# Patient Record
Sex: FEMALE | Employment: UNEMPLOYED | URBAN - METROPOLITAN AREA
[De-identification: names, ages, dates, MRNs, and addresses within clinical notes are randomized per-mention and may not be internally consistent; named-entity substitution may affect disease eponyms.]

---

## 2024-01-29 ENCOUNTER — HOSPITAL ENCOUNTER (INPATIENT)
Age: 89
LOS: 4 days | Discharge: OTHER INSTITUTION | End: 2024-02-02
Attending: INTERNAL MEDICINE | Admitting: INTERNAL MEDICINE
Payer: MEDICARE

## 2024-01-29 PROBLEM — N17.9 AKI (ACUTE KIDNEY INJURY) (HCC): Status: ACTIVE | Noted: 2024-01-29

## 2024-01-29 LAB
ALBUMIN SERPL-MCNC: 2.6 G/DL (ref 3.4–5)
ALBUMIN/GLOB SERPL: 1.1 {RATIO} (ref 1.1–2.2)
ALP SERPL-CCNC: 62 U/L (ref 40–129)
ALT SERPL-CCNC: 11 U/L (ref 10–40)
ANION GAP SERPL CALCULATED.3IONS-SCNC: 11 MMOL/L (ref 3–16)
AST SERPL-CCNC: 13 U/L (ref 15–37)
BILIRUB SERPL-MCNC: 0.3 MG/DL (ref 0–1)
BUN SERPL-MCNC: 77 MG/DL (ref 7–20)
CALCIUM SERPL-MCNC: 8.9 MG/DL (ref 8.3–10.6)
CHLORIDE SERPL-SCNC: 111 MMOL/L (ref 99–110)
CO2 SERPL-SCNC: 19 MMOL/L (ref 21–32)
CREAT SERPL-MCNC: 2 MG/DL (ref 0.6–1.2)
GFR SERPLBLD CREATININE-BSD FMLA CKD-EPI: 23 ML/MIN/{1.73_M2}
GLUCOSE SERPL-MCNC: 117 MG/DL (ref 70–99)
MAGNESIUM SERPL-MCNC: 2.3 MG/DL (ref 1.8–2.4)
POTASSIUM SERPL-SCNC: 3.4 MMOL/L (ref 3.5–5.1)
PROT SERPL-MCNC: 4.9 G/DL (ref 6.4–8.2)
SODIUM SERPL-SCNC: 141 MMOL/L (ref 136–145)

## 2024-01-29 PROCEDURE — 2580000003 HC RX 258: Performed by: INTERNAL MEDICINE

## 2024-01-29 PROCEDURE — 6370000000 HC RX 637 (ALT 250 FOR IP): Performed by: INTERNAL MEDICINE

## 2024-01-29 PROCEDURE — 93005 ELECTROCARDIOGRAM TRACING: CPT | Performed by: INTERNAL MEDICINE

## 2024-01-29 PROCEDURE — 83735 ASSAY OF MAGNESIUM: CPT

## 2024-01-29 PROCEDURE — 36415 COLL VENOUS BLD VENIPUNCTURE: CPT

## 2024-01-29 PROCEDURE — 1200000000 HC SEMI PRIVATE

## 2024-01-29 PROCEDURE — 80053 COMPREHEN METABOLIC PANEL: CPT

## 2024-01-29 RX ORDER — POTASSIUM CHLORIDE 20 MEQ/1
20 TABLET, EXTENDED RELEASE ORAL ONCE
Status: COMPLETED | OUTPATIENT
Start: 2024-01-29 | End: 2024-01-29

## 2024-01-29 RX ORDER — SODIUM CHLORIDE 0.9 % (FLUSH) 0.9 %
5-40 SYRINGE (ML) INJECTION EVERY 12 HOURS SCHEDULED
Status: DISCONTINUED | OUTPATIENT
Start: 2024-01-29 | End: 2024-02-02 | Stop reason: HOSPADM

## 2024-01-29 RX ORDER — SODIUM CHLORIDE 9 MG/ML
INJECTION, SOLUTION INTRAVENOUS PRN
Status: DISCONTINUED | OUTPATIENT
Start: 2024-01-29 | End: 2024-02-02 | Stop reason: HOSPADM

## 2024-01-29 RX ORDER — METOPROLOL SUCCINATE 50 MG/1
50 TABLET, EXTENDED RELEASE ORAL 2 TIMES DAILY
Status: DISCONTINUED | OUTPATIENT
Start: 2024-01-29 | End: 2024-02-02 | Stop reason: HOSPADM

## 2024-01-29 RX ORDER — LEVOTHYROXINE SODIUM 88 UG/1
88 TABLET ORAL DAILY
Status: DISCONTINUED | OUTPATIENT
Start: 2024-01-29 | End: 2024-02-02 | Stop reason: HOSPADM

## 2024-01-29 RX ORDER — ACETAMINOPHEN 650 MG/1
650 SUPPOSITORY RECTAL EVERY 6 HOURS PRN
Status: DISCONTINUED | OUTPATIENT
Start: 2024-01-29 | End: 2024-02-02 | Stop reason: HOSPADM

## 2024-01-29 RX ORDER — SODIUM CHLORIDE 0.9 % (FLUSH) 0.9 %
5-40 SYRINGE (ML) INJECTION PRN
Status: DISCONTINUED | OUTPATIENT
Start: 2024-01-29 | End: 2024-02-02 | Stop reason: HOSPADM

## 2024-01-29 RX ORDER — PROCHLORPERAZINE EDISYLATE 5 MG/ML
10 INJECTION INTRAMUSCULAR; INTRAVENOUS EVERY 6 HOURS PRN
Status: DISCONTINUED | OUTPATIENT
Start: 2024-01-29 | End: 2024-02-02 | Stop reason: HOSPADM

## 2024-01-29 RX ORDER — PRAMIPEXOLE DIHYDROCHLORIDE 0.25 MG/1
0.25 TABLET ORAL 3 TIMES DAILY
Status: DISCONTINUED | OUTPATIENT
Start: 2024-01-29 | End: 2024-02-02 | Stop reason: HOSPADM

## 2024-01-29 RX ORDER — ACETAMINOPHEN 325 MG/1
650 TABLET ORAL EVERY 6 HOURS PRN
Status: DISCONTINUED | OUTPATIENT
Start: 2024-01-29 | End: 2024-02-02 | Stop reason: HOSPADM

## 2024-01-29 RX ORDER — SODIUM CHLORIDE 9 MG/ML
INJECTION, SOLUTION INTRAVENOUS CONTINUOUS
Status: DISCONTINUED | OUTPATIENT
Start: 2024-01-29 | End: 2024-01-30

## 2024-01-29 RX ORDER — POLYETHYLENE GLYCOL 3350 17 G/17G
17 POWDER, FOR SOLUTION ORAL DAILY PRN
Status: DISCONTINUED | OUTPATIENT
Start: 2024-01-29 | End: 2024-02-02 | Stop reason: HOSPADM

## 2024-01-29 RX ADMIN — PRAMIPEXOLE DIHYDROCHLORIDE 0.25 MG: 0.25 TABLET ORAL at 21:48

## 2024-01-29 RX ADMIN — LEVOTHYROXINE SODIUM 88 MCG: 88 TABLET ORAL at 18:39

## 2024-01-29 RX ADMIN — SODIUM CHLORIDE: 9 INJECTION, SOLUTION INTRAVENOUS at 18:34

## 2024-01-29 RX ADMIN — POTASSIUM CHLORIDE 20 MEQ: 1500 TABLET, EXTENDED RELEASE ORAL at 18:34

## 2024-01-29 NOTE — H&P
Hospital Medicine History and Physical      Chief Complaint:  unspecified perineal bleeding      History and Present Illness:  The patient is a pleasant 89 Y F who seems to have a history of dementia.  Background info is extremely limited.  She has no PMH listed in her [place] ED paperwork.  There appear to be no previous Epic encounters with her.  We have no contact information for her family.  Apparently she has lived with family \"for the last 3 months.\"  She tells me that her   at some point.  She is completely disoriented, has no insight.   Records from [place] say that she presented because after urinating family saw \"blood in the toilet.\"  Family reportedly thought it was vaginal bleeding.  The patient said that she has a h/o dysfunctional uterine bleeding but refused a hysterectomy in the past.  Hb was 10.  The ED doc performed a vaginal exam with a speculum and did not find any evidence of vaginal bleeding.  They also performed a rectal exam and found normal brown stool, though it did test positive for occult blood.  Her urine wasn't bloody.  During the exam they noticed her multiple sacral pressure wounds and areas of skin breakdown on her buttock.  One of these areas was bleeding, and they presumed that this was the actual source of the blood loss.  It stopped bleeding with time.  The wounds didn't look infected.  She didn't have a leukocytosis or fever.  The [place] note lists rivaroxaban in her medication list (indication unknown), but then they write that she \"doesn't take any blood thinners.\"     Regardless of the possible bleeding issue, the patient was found to have a BUN / Cr of 118 / 3.7.  Cr was reportedly 1.5 in 2023.  She had furosemide 40 qd and valsartan 160 qd on her med list.  Initial BP was 101/50, improved with a 1L saline bolus.  Labs didn't show any significant issues with hyperkalemia or acidosis.  They repeated a BMP a few hours later and Cr had improved some to  3.19.  [facility] apparently doesn't have nephrology available, so our nocturnist accepted her here.  Upon arrival here she had no complaints, was in good spirits, was confused.  VSS.        General appearance: No apparent distress, appears stated age and cooperative.  HEENT: Pupils equal, round.  Conjunctivae/corneas clear.  Neck: No jugular venous distention.   Respiratory:  Normal respiratory effort.  Bilaterally without Rales/Wheezes/Rhonchi.  Cardiovascular: Normal rate and regular rhythm with normal S1/S2 without murmurs, rubs or gallops.  Abdomen: Soft, non-tender, non-distended with normal bowel sounds.  Musculoskeletal: No cyanosis bilaterally.  No edema.  Without deformity.  Skin: No jaundice.  Non-infected, non-bleeding sacral pressure ulcers and skin tears.  Neurologic:  Neurovascularly intact without any focal sensory/motor deficits. Cranial nerves: II-XII intact, grossly non-focal.  Psychiatric: alert, oriented to self only, does not have insight.        Assessment and Plan:        VANCE on CKD3.  Likely due to inadequate PO intake, resulting in dehydration, in the setting of furosemide and valsartan usage.  Baseline Cr perhaps about 1.5.  Monitor response to IVFs.  F/u renal US and bladder scan.    Sacral wounds.  Wound care consulted.     [facility] felt like she might have a UTI, gave a dose of ceftriaxone.  I don't see their UA results.  The patient denies dysuria.  Family apparently said she just finished a course of outpatient cefdinir for a possible UTI, also fluconazole for vaginal candidiasis.  F/u urine culture, no further abx planned at this point.     RN trying to find out whether (and why) she might be on rivaroxaban.  F/u EKG.    HTN.  Held furosemide and valsartan.  Continue metoprolol.    Hypothyroidism.  Clinically euthyroid.  Continue home dose of levothyroxine.     RLS.  Pramipexole.      Dementia.  Supportive care.      Diet: ADULT DIET; Regular  ADULT ORAL NUTRITION SUPPLEMENT;

## 2024-01-29 NOTE — PLAN OF CARE
Problem: Discharge Planning  Goal: Discharge to home or other facility with appropriate resources  Outcome: Progressing     Problem: Skin/Tissue Integrity  Goal: Absence of new skin breakdown  Description: 1.  Monitor for areas of redness and/or skin breakdown  2.  Assess vascular access sites hourly  3.  Every 4-6 hours minimum:  Change oxygen saturation probe site  4.  Every 4-6 hours:  If on nasal continuous positive airway pressure, respiratory therapy assess nares and determine need for appliance change or resting period.  Outcome: Progressing     Problem: ABCDS Injury Assessment  Goal: Absence of physical injury  Outcome: Progressing     Problem: Confusion  Goal: Confusion, delirium, dementia, or psychosis is improved or at baseline  Description: INTERVENTIONS:  1. Assess for possible contributors to thought disturbance, including medications, impaired vision or hearing, underlying metabolic abnormalities, dehydration, psychiatric diagnoses, and notify attending LIP  2. [place] high risk fall precautions, as indicated  3. Provide frequent short contacts to provide reality reorientation, refocusing and direction  4. Decrease environmental stimuli, including noise as appropriate  5. Monitor and intervene to maintain adequate nutrition, hydration, elimination, sleep and activity  6. If unable to ensure safety without constant attention obtain sitter and review sitter guidelines with assigned personnel  7. Initiate Psychosocial CNS and Spiritual Care consult, as indicated  Outcome: Progressing     Problem: Safety - Adult  Goal: Free from fall injury  Outcome: Progressing

## 2024-01-29 NOTE — PROGRESS NOTES
Called pts HEATHER Wang.  He was at work and was unable to answer admission questions.  He did not have the pts medication list with him.  He told me to call his mother Frannie.  Frannie answered a few questions, then instructed me to call Felipe tomorrow around 11 am.

## 2024-01-30 ENCOUNTER — APPOINTMENT (OUTPATIENT)
Dept: ULTRASOUND IMAGING | Age: 89
End: 2024-01-30
Attending: INTERNAL MEDICINE
Payer: MEDICARE

## 2024-01-30 LAB
ANION GAP SERPL CALCULATED.3IONS-SCNC: 11 MMOL/L (ref 3–16)
BACTERIA UR CULT: NORMAL
BUN SERPL-MCNC: 66 MG/DL (ref 7–20)
CALCIUM SERPL-MCNC: 9.1 MG/DL (ref 8.3–10.6)
CHLORIDE SERPL-SCNC: 112 MMOL/L (ref 99–110)
CO2 SERPL-SCNC: 18 MMOL/L (ref 21–32)
CREAT SERPL-MCNC: 1.7 MG/DL (ref 0.6–1.2)
DEPRECATED RDW RBC AUTO: 15.9 % (ref 12.4–15.4)
EKG ATRIAL RATE: 64 BPM
EKG DIAGNOSIS: NORMAL
EKG P AXIS: 72 DEGREES
EKG P-R INTERVAL: 180 MS
EKG Q-T INTERVAL: 448 MS
EKG QRS DURATION: 92 MS
EKG QTC CALCULATION (BAZETT): 462 MS
EKG R AXIS: -60 DEGREES
EKG T AXIS: 91 DEGREES
EKG VENTRICULAR RATE: 64 BPM
GFR SERPLBLD CREATININE-BSD FMLA CKD-EPI: 28 ML/MIN/{1.73_M2}
GLUCOSE SERPL-MCNC: 111 MG/DL (ref 70–99)
HCT VFR BLD AUTO: 31.3 % (ref 36–48)
HGB BLD-MCNC: 10.3 G/DL (ref 12–16)
MAGNESIUM SERPL-MCNC: 2.2 MG/DL (ref 1.8–2.4)
MCH RBC QN AUTO: 29.6 PG (ref 26–34)
MCHC RBC AUTO-ENTMCNC: 33 G/DL (ref 31–36)
MCV RBC AUTO: 89.8 FL (ref 80–100)
PLATELET # BLD AUTO: 240 K/UL (ref 135–450)
PMV BLD AUTO: 8.3 FL (ref 5–10.5)
POTASSIUM SERPL-SCNC: 3.5 MMOL/L (ref 3.5–5.1)
RBC # BLD AUTO: 3.49 M/UL (ref 4–5.2)
SODIUM SERPL-SCNC: 141 MMOL/L (ref 136–145)
WBC # BLD AUTO: 7 K/UL (ref 4–11)

## 2024-01-30 PROCEDURE — 6360000002 HC RX W HCPCS: Performed by: NURSE PRACTITIONER

## 2024-01-30 PROCEDURE — 97530 THERAPEUTIC ACTIVITIES: CPT

## 2024-01-30 PROCEDURE — 76770 US EXAM ABDO BACK WALL COMP: CPT

## 2024-01-30 PROCEDURE — 87086 URINE CULTURE/COLONY COUNT: CPT

## 2024-01-30 PROCEDURE — 2580000003 HC RX 258: Performed by: INTERNAL MEDICINE

## 2024-01-30 PROCEDURE — 80048 BASIC METABOLIC PNL TOTAL CA: CPT

## 2024-01-30 PROCEDURE — 97116 GAIT TRAINING THERAPY: CPT

## 2024-01-30 PROCEDURE — 97162 PT EVAL MOD COMPLEX 30 MIN: CPT

## 2024-01-30 PROCEDURE — 2580000003 HC RX 258

## 2024-01-30 PROCEDURE — 36415 COLL VENOUS BLD VENIPUNCTURE: CPT

## 2024-01-30 PROCEDURE — 97535 SELF CARE MNGMENT TRAINING: CPT

## 2024-01-30 PROCEDURE — 97166 OT EVAL MOD COMPLEX 45 MIN: CPT

## 2024-01-30 PROCEDURE — 1200000000 HC SEMI PRIVATE

## 2024-01-30 PROCEDURE — 2500000003 HC RX 250 WO HCPCS: Performed by: INTERNAL MEDICINE

## 2024-01-30 PROCEDURE — 6370000000 HC RX 637 (ALT 250 FOR IP): Performed by: INTERNAL MEDICINE

## 2024-01-30 PROCEDURE — 83735 ASSAY OF MAGNESIUM: CPT

## 2024-01-30 PROCEDURE — 93010 ELECTROCARDIOGRAM REPORT: CPT | Performed by: INTERNAL MEDICINE

## 2024-01-30 PROCEDURE — 85027 COMPLETE CBC AUTOMATED: CPT

## 2024-01-30 RX ORDER — FUROSEMIDE 40 MG/1
40 TABLET ORAL 2 TIMES DAILY
Status: ON HOLD | COMMUNITY
End: 2024-02-02 | Stop reason: HOSPADM

## 2024-01-30 RX ORDER — HONEY 100 %
PASTE (ML) TOPICAL 2 TIMES DAILY
Status: DISCONTINUED | OUTPATIENT
Start: 2024-01-30 | End: 2024-02-02 | Stop reason: HOSPADM

## 2024-01-30 RX ORDER — PRAMIPEXOLE DIHYDROCHLORIDE 0.25 MG/1
0.25 TABLET ORAL 3 TIMES DAILY
Status: ON HOLD | COMMUNITY
End: 2024-02-02

## 2024-01-30 RX ORDER — LEVOTHYROXINE SODIUM 88 UG/1
88 TABLET ORAL DAILY
Status: ON HOLD | COMMUNITY
End: 2024-02-02

## 2024-01-30 RX ORDER — ZIPRASIDONE MESYLATE 20 MG/ML
10 INJECTION, POWDER, LYOPHILIZED, FOR SOLUTION INTRAMUSCULAR ONCE
Status: COMPLETED | OUTPATIENT
Start: 2024-01-30 | End: 2024-01-30

## 2024-01-30 RX ORDER — POTASSIUM CHLORIDE 20 MEQ/1
40 TABLET, EXTENDED RELEASE ORAL ONCE
Status: COMPLETED | OUTPATIENT
Start: 2024-01-30 | End: 2024-01-30

## 2024-01-30 RX ORDER — WATER 10 ML/10ML
INJECTION INTRAMUSCULAR; INTRAVENOUS; SUBCUTANEOUS
Status: COMPLETED
Start: 2024-01-30 | End: 2024-01-30

## 2024-01-30 RX ORDER — METOPROLOL SUCCINATE 50 MG/1
50 TABLET, EXTENDED RELEASE ORAL DAILY
Status: ON HOLD | COMMUNITY
End: 2024-02-02

## 2024-01-30 RX ORDER — VALSARTAN AND HYDROCHLOROTHIAZIDE 160; 12.5 MG/1; MG/1
1 TABLET, FILM COATED ORAL DAILY
Status: ON HOLD | COMMUNITY
End: 2024-02-02

## 2024-01-30 RX ADMIN — Medication: at 15:04

## 2024-01-30 RX ADMIN — METOPROLOL SUCCINATE 50 MG: 50 TABLET, EXTENDED RELEASE ORAL at 20:10

## 2024-01-30 RX ADMIN — SODIUM CHLORIDE 1000 ML: 9 INJECTION, SOLUTION INTRAVENOUS at 07:20

## 2024-01-30 RX ADMIN — SODIUM BICARBONATE: 84 INJECTION, SOLUTION INTRAVENOUS at 12:01

## 2024-01-30 RX ADMIN — LEVOTHYROXINE SODIUM 88 MCG: 88 TABLET ORAL at 05:06

## 2024-01-30 RX ADMIN — METOPROLOL SUCCINATE 50 MG: 50 TABLET, EXTENDED RELEASE ORAL at 08:34

## 2024-01-30 RX ADMIN — PRAMIPEXOLE DIHYDROCHLORIDE 0.25 MG: 0.25 TABLET ORAL at 15:02

## 2024-01-30 RX ADMIN — Medication: at 20:10

## 2024-01-30 RX ADMIN — PRAMIPEXOLE DIHYDROCHLORIDE 0.25 MG: 0.25 TABLET ORAL at 20:09

## 2024-01-30 RX ADMIN — ZIPRASIDONE MESYLATE 10 MG: 20 INJECTION, POWDER, LYOPHILIZED, FOR SOLUTION INTRAMUSCULAR at 23:50

## 2024-01-30 RX ADMIN — POTASSIUM CHLORIDE 40 MEQ: 1500 TABLET, EXTENDED RELEASE ORAL at 11:59

## 2024-01-30 RX ADMIN — WATER 2 ML: 1 INJECTION INTRAMUSCULAR; INTRAVENOUS; SUBCUTANEOUS at 23:50

## 2024-01-30 RX ADMIN — RIVAROXABAN 15 MG: 15 TABLET, FILM COATED ORAL at 17:39

## 2024-01-30 RX ADMIN — PRAMIPEXOLE DIHYDROCHLORIDE 0.25 MG: 0.25 TABLET ORAL at 08:34

## 2024-01-30 NOTE — PROGRESS NOTES
Hospital Medicine Progress Note        Subjective:  Patient was crying in her sleep when I walked in.  She quickly woke up, and immediately started smiling and seemed happy.  She says nothing is bothering her, isn't sad.  Denies pain, denies dyspnea.  She remains confused.      General appearance: No apparent distress, appears stated age and cooperative.  HEENT: Pupils equal, round.  Conjunctivae/corneas clear.  Neck: No jugular venous distention.   Respiratory:  Normal respiratory effort.  Bilaterally without Rales/Wheezes/Rhonchi.  Cardiovascular: Normal rate and regular rhythm with normal S1/S2 without murmurs, rubs or gallops.  Abdomen: Soft, non-tender, non-distended with normal bowel sounds.  Musculoskeletal: No cyanosis bilaterally.  1+ partially-pitting edema.  Without deformity.  Skin: No jaundice.  Non-infected, non-bleeding sacral pressure ulcers and skin tears.  Neurologic:  Neurovascularly intact without any focal sensory/motor deficits. Cranial nerves: II-XII intact, grossly non-focal.  Psychiatric: alert, oriented to self only, does not have insight.      Assessment and Plan:       The patient is a pleasant 89 Y F who seems to have a history of dementia.  Background info is extremely limited.  She has no PMH listed in her [facility] ED paperwork.  There appear to be no previous Epic encounters with her.  We have no contact information for her family.  Apparently she has lived with family \"for the last 3 months.\"  She tells me that her   at some point.  She is completely disoriented, has no insight.              Records from [facility] say that she presented because after urinating family saw \"blood in the toilet.\"  Family reportedly thought it was vaginal bleeding.  The patient said that she has a h/o dysfunctional uterine bleeding but refused a hysterectomy in the past.  Hb was 10.  The ED doc performed a vaginal exam with a speculum and did not find any evidence of vaginal bleeding.  They

## 2024-01-30 NOTE — ACP (ADVANCE CARE PLANNING)
Advance Care Planning     General Advance Care Planning (ACP) Conversation    Date of Conversation: 1/28/2024  Conducted with: Patient with Decision Making Capacity   Healthcare Decision Maker: Next of Kin by law (only applies in absence of above) (name) nephneel Wang     Healthcare Decision Maker:    Primary Decision Maker: TrueFelipe - Niece/Nephew - [phone]  Click here to complete Healthcare Decision Makers including selection of the Healthcare Decision Maker Relationship (ie \"Primary\").  Today we documented Decision Maker(s) consistent with Legal Next of Kin hierarchy.    Content/Action Overview:  Has NO ACP documents/care preferences - information provided, considering goals and options  Reviewed DNR/DNI and patient elects Full Code (Attempt Resuscitation)      Length of Voluntary ACP Conversation in minutes:  <16 minutes (Non-Billable)    Cristin Ruth RN

## 2024-01-30 NOTE — PROGRESS NOTES
Physical Therapy  Facility/Department: [facility] [address] - MED SURG  Physical Therapy Initial Assessment    Name: Jes Garcia  : 1932  MRN: [id number]  Date of Service: 2024    Discharge Recommendations:  Subacute/Skilled Nursing Facility   PT Equipment Recommendations  Equipment Needed: No      Patient Diagnosis(es): There were no encounter diagnoses.  Past Medical History:  has no past medical history on file.  Past Surgical History:  has no past surgical history on file.    Assessment   Body Structures, Functions, Activity Limitations Requiring Skilled Therapeutic Intervention: Decreased functional mobility ;Decreased endurance;Decreased strength;Decreased safe awareness;Decreased balance;Decreased tolerance to work activity  Assessment: Pt is a 89 y/o female who presents with VANCE. Pt's social history unclear as pt is poor historian. Pt currently requires min A for bed mobility, transfers and ambulation with RW. Pt would benefit from continued skilled PT to address these limitations. Recommend SNF for continued therapy.  Treatment Diagnosis: impaired functional mobility  Therapy Prognosis: Fair  Decision Making: Medium Complexity  Requires PT Follow-Up: Yes  Activity Tolerance  Activity Tolerance: Patient tolerated evaluation without incident     Plan   Physical Therapy Plan  General Plan: 3-5 times per week  Current Treatment Recommendations: Strengthening, Balance training, Functional mobility training, Transfer training, Endurance training, Gait training, Home exercise program, Safety education & training, Therapeutic activities, Patient/Caregiver education & training  Safety Devices  Type of Devices: All fall risk precautions in place, Call light within reach, Patient at risk for falls, Bed alarm in place, Left in bed, Nurse notified, Gait belt, Telesitter in use     Restrictions  Restrictions/Precautions  Restrictions/Precautions: Fall Risk  Position Activity Restriction  Other position/activity

## 2024-01-30 NOTE — PROGRESS NOTES
Comprehensive Nutrition Assessment    Type and Reason for Visit:  Initial, Wound    Nutrition Recommendations/Plan:   Continue regular diet and encourage PO intake   Modify ensure to BID   Monitor nutrition adequacy, pertinent labs, bowel habits, wt changes, and clinical progress     Malnutrition Assessment:  Malnutrition Status:  At risk for malnutrition (Comment) (01/30/24 1401)    Context:  Acute Illness     Findings of the 6 clinical characteristics of malnutrition:  Energy Intake:  Mild decrease in energy intake (Comment)  Fluid Accumulation:  Mild Extremities    Nutrition Assessment:    Positive screen for wound: 89 Y F admitted w/ dementia and VANCE on CKD3. On regular diet. Pt sleeping at time of visit. Spoke to RN, reports pt ate about 50% of breakfast and 0% of lunch today. No wt hx to review. RN reports he will encourage intake of ensure, Ensure added BID. Continue to encourage PO intake, will continue to monitor.    Nutrition Related Findings:    BLE + 2 pitting edema. BG WNL. Labs reviewed. Wound Type: Pressure Injury, Stage II       Current Nutrition Intake & Therapies:    Average Meal Intake: 26-50%  Average Supplements Intake: Unable to assess  ADULT DIET; Regular  ADULT ORAL NUTRITION SUPPLEMENT; Breakfast, Lunch, Dinner; Standard High Calorie/High Protein Oral Supplement    Anthropometric Measures:  Height: 157.5 cm (5' 2\")  Ideal Body Weight (IBW): 110 lbs (50 kg)       Current Body Weight: 54 kg (119 lb), 108.2 % IBW. Weight Source: Not Specified  Current BMI (kg/m2): 21.8        Weight Adjustment For: No Adjustment                 BMI Categories: Normal Weight (BMI 18.5-24.9)    Estimated Daily Nutrient Needs:  Energy Requirements Based On: Kcal/kg (28-33 kcals/kg)  Weight Used for Energy Requirements: Ideal (50 kg)  Energy (kcal/day): 8690-1083  Weight Used for Protein Requirements: Ideal (1.2-1.4 g/kg)  Protein (g/day): 60-75 g  Method Used for Fluid Requirements: 1 ml/kcal  Fluid (ml/day):

## 2024-01-30 NOTE — PLAN OF CARE
Problem: Cardiovascular - Adult  Goal: Maintains optimal cardiac output and hemodynamic stability  1/29/2024 2238 by Lynette Mendoza RN  Outcome: Progressing  1/29/2024 2226 by Lynette Mendoza RN  Outcome: Progressing     Problem: Skin/Tissue Integrity - Adult  Goal: Incisions, wounds, or drain sites healing without S/S of infection  1/29/2024 2238 by Lynette Mendoza RN  Outcome: Progressing  1/29/2024 2226 by Lynette Mendoza RN  Outcome: Progressing     Problem: Genitourinary - Adult  Goal: Absence of urinary retention  1/29/2024 2238 by Lynette Mendoza RN  Outcome: Progressing     Problem: Confusion  Goal: Confusion, delirium, dementia, or psychosis is improved or at baseline  Description: INTERVENTIONS:  1. Assess for possible contributors to thought disturbance, including medications, impaired vision or hearing, underlying metabolic abnormalities, dehydration, psychiatric diagnoses, and notify attending LIP  2. [place] high risk fall precautions, as indicated  3. Provide frequent short contacts to provide reality reorientation, refocusing and direction  4. Decrease environmental stimuli, including noise as appropriate  5. Monitor and intervene to maintain adequate nutrition, hydration, elimination, sleep and activity  6. If unable to ensure safety without constant attention obtain sitter and review sitter guidelines with assigned personnel  7. Initiate Psychosocial CNS and Spiritual Care consult, as indicated  1/29/2024 2238 by Lynette Mendoza RN  Outcome: Progressing  1/29/2024 2226 by Lynette Mendoza RN  Outcome: Progressing  1/29/2024 1844 by Elizabeth Skinner RN  Outcome: Progressing     Problem: ABCDS Injury Assessment  Goal: Absence of physical injury  1/29/2024 2238 by Lynette Mendoza RN  Outcome: Progressing     Problem: Safety - Adult  Goal: Free from fall injury  1/29/2024 2238 by Lynette Mendoza RN  Outcome: Progressing  1/29/2024 2226 by Lynette Mendoza RN  Outcome: Progressing  1/29/2024 1844 by Elizabeth Skinner RN  Outcome:

## 2024-01-30 NOTE — CONSULTS
[facility] Contin[facility] Nurse  Consult Note       NAME:  Jes Garcia  MEDICAL RECORD NUMBER:  [id number]  AGE: 89 y.o.   GENDER: female  : 1932  TODAY'S DATE:  2024    Subjective; I don't know. Is there something on my bottom?   Reason for WOCN Evaluation and Assessment: sacrum      Jes Garcia is a 89 y.o. female referred by:   [] Physician  [x] Nursing  [] Other:     Wound Identification:  Wound Type: pressure  Contributing Factors: chronic pressure, decreased mobility, and shear force    Wound History: 89 Y F she has lived with family \"for the last 3 she presented because after urinating family saw \"blood in the toilet. During the exam they noticed her multiple sacral pressure wounds and areas of skin breakdown on her buttock. One of these areas was bleeding, and they presumed that this was the actual source of the blood loss. months.\"   Current Wound Care Treatment:  foam    Patient Goal of Care:  [x] Wound Healing  [] Odor Control  [] Palliative Care  [x] Pain Control   [] Other:         PAST MEDICAL HISTORY    No past medical history on file.    PAST SURGICAL HISTORY    No past surgical history on file.    FAMILY HISTORY    No family history on file.    SOCIAL HISTORY         ALLERGIES    No Known Allergies    MEDICATIONS    No current facility-administered medications on file prior to encounter.     No current outpatient medications on file prior to encounter.       Objective; lying in bed    /65   Pulse 79   Temp 97.8 °F (36.6 °C) (Oral)   Resp 18   Ht 1.575 m (5' 2\")   Wt 54.2 kg (119 lb 8 oz)   SpO2 97%   BMI 21.86 kg/m²     LABS:  WBC:    Lab Results   Component Value Date/Time    WBC 7.0 2024 05:31 AM     H/H:    Lab Results   Component Value Date/Time    HGB 10.3 2024 05:31 AM    HCT 31.3 2024 05:31 AM     PTT:  No results found for: \"APTT\", \"PTT\"[APTT}  PT/INR:  No results found for: \"PROTIME\", \"INR\"  HgBA1c:  No results found for:   Apply Medi honey to open areas.    Provide waffle cushion for patient while in bed or chair to relieve pressure.    Specialty Bed Required : Yes   [] Low Air Loss   [x] Pressure Redistribution  [] Fluid Immersion  [] Bariatric  [] Total Pressure Relief  [] Other:     Current Diet: ADULT DIET; Regular  ADULT ORAL NUTRITION SUPPLEMENT; Breakfast, Lunch, Dinner; Standard High Calorie/High Protein Oral Supplement  Dietician consult:  Yes    Discharge Plan:  Placement for patient upon discharge: skilled nursing    Patient appropriate for Outpatient Wound Care Center: Yes    Referrals:  [x]  / discharge planner following  [] Home Health Care  [] Supplies  [] Other    Patient/Caregiver Teaching:  Level of patient/caregiver understanding able to:   [] Indicates understanding       [x] Needs reinforcement  [] Unsuccessful      [] Verbal Understanding  [] Demonstrated understanding       [] No evidence of learning  [] Refused teaching         [] N/A       Electronically signed by Collette Morrissey RN, BSN on 1/30/2024 at 2:01 PM

## 2024-01-30 NOTE — PROGRESS NOTES
Occupational Therapy  Facility/Department: [facility] - MED SURG  Occupational Therapy Initial Assessment    Name: Jes Garcia  : 1932  MRN: [id number]  Date of Service: 2024    Discharge Recommendations:  Subacute/Skilled Nursing Facility  OT Equipment Recommendations  Equipment Needed: No    Therapy discharge recommendations take into account each patient's current medical complexities and are subject to input/oversight from the patient's healthcare team.   Barriers to Home Discharge:   [] Steps to access home entry or bed/bath:   [x] Unable to transfer, ambulate, or propel wheelchair household distances without assist   [] Limited available assist at home upon discharge    [] Patient or family requests d/c to post-acute facility    [x] Poor cognition/safety awareness for d/c to home alone    []Unable to maintain ordered weight bearing status    [] Patient with salient signs of long-standing immobility   [x] Patient is at risk for falls due to:   [x] Other: Decreased ADL status    If pt is unable to be seen after this session, please let this note serve as discharge summary.  Please see case management note for discharge disposition.  Thank you.       Patient Diagnosis(es): There were no encounter diagnoses.  Past Medical History:  has no past medical history on file.  Past Surgical History:  has no past surgical history on file.           Assessment   Performance deficits / Impairments: Decreased functional mobility ;Decreased ADL status;Decreased high-level IADLs;Decreased endurance;Decreased cognition;Decreased safe awareness;Decreased strength  Assessment: Pt is a 89yo woman admitted d/t VANCE. Pt reports she is from home with her  however per chart review it appears pt is from [place]-- Pt poor historian. Pt presents with the above deficits this day and is limited by poor cognition. Pt Min A for bed mobility and Min A for functional mobility with RW. Pt req cues for safety. Pt would

## 2024-01-30 NOTE — PLAN OF CARE
Problem: Discharge Planning  Goal: Discharge to home or other facility with appropriate resources  1/29/2024 1844 by Elizabeth Skinner RN  Outcome: Progressing     Problem: Skin/Tissue Integrity  Goal: Absence of new skin breakdown  Description: 1.  Monitor for areas of redness and/or skin breakdown  2.  Assess vascular access sites hourly  3.  Every 4-6 hours minimum:  Change oxygen saturation probe site  4.  Every 4-6 hours:  If on nasal continuous positive airway pressure, respiratory therapy assess nares and determine need for appliance change or resting period.  1/29/2024 2226 by Lynette Mendoza RN  Outcome: Progressing  1/29/2024 1844 by Elizabeth Skinner RN  Outcome: Progressing     Problem: ABCDS Injury Assessment  Goal: Absence of physical injury  1/29/2024 2226 by Lynette Mendoza RN  Outcome: Progressing  1/29/2024 1844 by Elizabeth Skinner RN  Outcome: Progressing     Problem: Confusion  Goal: Confusion, delirium, dementia, or psychosis is improved or at baseline  Description: INTERVENTIONS:  1. Assess for possible contributors to thought disturbance, including medications, impaired vision or hearing, underlying metabolic abnormalities, dehydration, psychiatric diagnoses, and notify attending LIP  2. [place] high risk fall precautions, as indicated  3. Provide frequent short contacts to provide reality reorientation, refocusing and direction  4. Decrease environmental stimuli, including noise as appropriate  5. Monitor and intervene to maintain adequate nutrition, hydration, elimination, sleep and activity  6. If unable to ensure safety without constant attention obtain sitter and review sitter guidelines with assigned personnel  7. Initiate Psychosocial CNS and Spiritual Care consult, as indicated  1/29/2024 2226 by Lynette Mendoza RN  Outcome: Progressing  1/29/2024 1844 by Elizabeth Skinner RN  Outcome: Progressing     Problem: Safety - Adult  Goal: Free from fall injury  1/29/2024 2226 by Lynette Mendoza RN  Outcome:  Progressing  1/29/2024 1844 by Elizabeth Skinner, RN  Outcome: Progressing     Problem: Genitourinary - Adult  Goal: Absence of urinary retention  Outcome: Progressing     Problem: Skin/Tissue Integrity - Adult  Goal: Incisions, wounds, or drain sites healing without S/S of infection  Outcome: Progressing

## 2024-01-31 LAB
ANION GAP SERPL CALCULATED.3IONS-SCNC: 10 MMOL/L (ref 3–16)
BUN SERPL-MCNC: 37 MG/DL (ref 7–20)
CALCIUM SERPL-MCNC: 9.6 MG/DL (ref 8.3–10.6)
CHLORIDE SERPL-SCNC: 110 MMOL/L (ref 99–110)
CO2 SERPL-SCNC: 22 MMOL/L (ref 21–32)
CREAT SERPL-MCNC: 1.3 MG/DL (ref 0.6–1.2)
GFR SERPLBLD CREATININE-BSD FMLA CKD-EPI: 39 ML/MIN/{1.73_M2}
GLUCOSE SERPL-MCNC: 113 MG/DL (ref 70–99)
MAGNESIUM SERPL-MCNC: 2 MG/DL (ref 1.8–2.4)
POTASSIUM SERPL-SCNC: 3.5 MMOL/L (ref 3.5–5.1)
SODIUM SERPL-SCNC: 142 MMOL/L (ref 136–145)

## 2024-01-31 PROCEDURE — 6370000000 HC RX 637 (ALT 250 FOR IP): Performed by: HOSPITALIST

## 2024-01-31 PROCEDURE — 1200000000 HC SEMI PRIVATE

## 2024-01-31 PROCEDURE — 6370000000 HC RX 637 (ALT 250 FOR IP): Performed by: INTERNAL MEDICINE

## 2024-01-31 PROCEDURE — 2580000003 HC RX 258: Performed by: INTERNAL MEDICINE

## 2024-01-31 PROCEDURE — 83735 ASSAY OF MAGNESIUM: CPT

## 2024-01-31 PROCEDURE — 80048 BASIC METABOLIC PNL TOTAL CA: CPT

## 2024-01-31 PROCEDURE — 2500000003 HC RX 250 WO HCPCS: Performed by: INTERNAL MEDICINE

## 2024-01-31 PROCEDURE — 36415 COLL VENOUS BLD VENIPUNCTURE: CPT

## 2024-01-31 RX ORDER — DIVALPROEX SODIUM 125 MG/1
125 CAPSULE, COATED PELLETS ORAL EVERY 8 HOURS PRN
Status: DISCONTINUED | OUTPATIENT
Start: 2024-01-31 | End: 2024-02-02 | Stop reason: HOSPADM

## 2024-01-31 RX ADMIN — RIVAROXABAN 15 MG: 15 TABLET, FILM COATED ORAL at 17:52

## 2024-01-31 RX ADMIN — PRAMIPEXOLE DIHYDROCHLORIDE 0.25 MG: 0.25 TABLET ORAL at 15:35

## 2024-01-31 RX ADMIN — PRAMIPEXOLE DIHYDROCHLORIDE 0.25 MG: 0.25 TABLET ORAL at 08:59

## 2024-01-31 RX ADMIN — METOPROLOL SUCCINATE 50 MG: 50 TABLET, EXTENDED RELEASE ORAL at 20:15

## 2024-01-31 RX ADMIN — METOPROLOL SUCCINATE 50 MG: 50 TABLET, EXTENDED RELEASE ORAL at 08:59

## 2024-01-31 RX ADMIN — DIVALPROEX SODIUM 125 MG: 125 CAPSULE, COATED PELLETS ORAL at 15:35

## 2024-01-31 RX ADMIN — SODIUM CHLORIDE, PRESERVATIVE FREE 10 ML: 5 INJECTION INTRAVENOUS at 20:15

## 2024-01-31 RX ADMIN — PRAMIPEXOLE DIHYDROCHLORIDE 0.25 MG: 0.25 TABLET ORAL at 20:15

## 2024-01-31 RX ADMIN — SODIUM BICARBONATE: 84 INJECTION, SOLUTION INTRAVENOUS at 17:52

## 2024-01-31 RX ADMIN — Medication: at 10:00

## 2024-01-31 RX ADMIN — LEVOTHYROXINE SODIUM 88 MCG: 88 TABLET ORAL at 06:02

## 2024-01-31 RX ADMIN — Medication: at 20:21

## 2024-01-31 NOTE — PLAN OF CARE
Problem: Discharge Planning  Goal: Discharge to home or other facility with appropriate resources  Outcome: Progressing  Flowsheets (Taken 1/30/2024 2000)  Discharge to home or other facility with appropriate resources: Identify barriers to discharge with patient and caregiver     Problem: Skin/Tissue Integrity  Goal: Absence of new skin breakdown  Description: 1.  Monitor for areas of redness and/or skin breakdown  2.  Assess vascular access sites hourly  3.  Every 4-6 hours minimum:  Change oxygen saturation probe site  4.  Every 4-6 hours:  If on nasal continuous positive airway pressure, respiratory therapy assess nares and determine need for appliance change or resting period.  Outcome: Progressing     Problem: ABCDS Injury Assessment  Goal: Absence of physical injury  Outcome: Progressing     Problem: Confusion  Goal: Confusion, delirium, dementia, or psychosis is improved or at baseline  Description: INTERVENTIONS:  1. Assess for possible contributors to thought disturbance, including medications, impaired vision or hearing, underlying metabolic abnormalities, dehydration, psychiatric diagnoses, and notify attending LIP  2. [place] high risk fall precautions, as indicated  3. Provide frequent short contacts to provide reality reorientation, refocusing and direction  4. Decrease environmental stimuli, including noise as appropriate  5. Monitor and intervene to maintain adequate nutrition, hydration, elimination, sleep and activity  6. If unable to ensure safety without constant attention obtain sitter and review sitter guidelines with assigned personnel  7. Initiate Psychosocial CNS and Spiritual Care consult, as indicated  Outcome: Progressing  Flowsheets (Taken 1/30/2024 2000)  Effect of thought disturbance (confusion, delirium, dementia, or psychosis) are managed with adequate functional status: Assess for contributors to thought disturbance, including medications, impaired vision or hearing, underlying  or without assistive devices     Problem: Genitourinary - Adult  Goal: Absence of urinary retention  Outcome: Progressing  Flowsheets (Taken 1/30/2024 2000)  Absence of urinary retention: Assess patient’s ability to void and empty bladder     Problem: Chronic Conditions and Co-morbidities  Goal: Patient's chronic conditions and co-morbidity symptoms are monitored and maintained or improved  Outcome: Progressing  Flowsheets (Taken 1/30/2024 2000)  Care Plan - Patient's Chronic Conditions and Co-Morbidity Symptoms are Monitored and Maintained or Improved: Monitor and assess patient's chronic conditions and comorbid symptoms for stability, deterioration, or improvement     Problem: Nutrition Deficit:  Goal: Optimize nutritional status  Outcome: Progressing

## 2024-01-31 NOTE — PROGRESS NOTES
V2.0    [facility] [place]      Name:  Jes Garcia /Age/Sex: 1932  (89 y.o. female)   MRN & CSN:  [id number] & [id number] Encounter Date/Time: 2024 1:08 PM EST   Location:  [address] [place] [address] PCP: No primary care provider on file.     Attending:Yasir Massey MD       Hospital Day: 3    Assessment and Recommendations     Patient is a 89-year-old female past medical history of hypertension, hypothyroidism, restless leg syndrome, dementia who was admitted for acute kidney injury.  Patient now improving and now getting medically ready for discharge awaiting placement for rehab.    #.  VANCE on CKD stage III  Likely due to combination of poor oral intake and being on losartan and Lasix  Continue to hold valsartan and Lasix and continue IV fluid    #.  Generalized weakness and physical deconditioning  PT/OT  Potential rehab upon DC.    #.  Sacral wound  Wound care    #.  Urinary tract infection: Ruled out    #.  Primary hypertension: Controlled  Continue metoprolol and continue to hold Lasix and losartan for now    #.  Hypothyroidism likely acquired  Continue levothyroxine    #.  Restless leg syndrome  Continue pramipexole    #.  History of dementia      DVT Prophylaxis: Lovenox.   Code Status: Total Support.   Barrier to DC: Getting medically ready for discharge awaiting SNF placement          Subjective:     Patient was seen and examined today.  No acute events overnight.  Patient remains afebrile with stable vital signs.    Review of Systems:      Pertinent positives and negatives discussed in HPI    Objective:     Intake/Output Summary (Last 24 hours) at 2024 1308  Last data filed at 2024 0859  Gross per 24 hour   Intake 480 ml   Output 1950 ml   Net -1470 ml      Vitals:   Vitals:    24 1445 24 2000 24 0800 24 0859   BP: 126/61 (!) 101/51 117/63    Pulse: 76 78 66 86   Resp: 18 16 17    Temp: 97.5 °F (36.4 °C) 97.7 °F (36.5 °C) 97.6 °F (36.4 °C)    TempSrc: Oral Oral Oral

## 2024-02-01 PROBLEM — F03.90 DEMENTIA (HCC): Status: ACTIVE | Noted: 2024-02-01

## 2024-02-01 PROBLEM — R53.1 GENERALIZED WEAKNESS: Status: ACTIVE | Noted: 2024-02-01

## 2024-02-01 LAB
ANION GAP SERPL CALCULATED.3IONS-SCNC: 8 MMOL/L (ref 3–16)
BUN SERPL-MCNC: 21 MG/DL (ref 7–20)
CALCIUM SERPL-MCNC: 8.7 MG/DL (ref 8.3–10.6)
CHLORIDE SERPL-SCNC: 107 MMOL/L (ref 99–110)
CO2 SERPL-SCNC: 25 MMOL/L (ref 21–32)
CREAT SERPL-MCNC: 1 MG/DL (ref 0.6–1.2)
DEPRECATED RDW RBC AUTO: 15.6 % (ref 12.4–15.4)
GFR SERPLBLD CREATININE-BSD FMLA CKD-EPI: 53 ML/MIN/{1.73_M2}
GLUCOSE SERPL-MCNC: 87 MG/DL (ref 70–99)
HCT VFR BLD AUTO: 29.3 % (ref 36–48)
HGB BLD-MCNC: 9.8 G/DL (ref 12–16)
MAGNESIUM SERPL-MCNC: 1.6 MG/DL (ref 1.8–2.4)
MCH RBC QN AUTO: 29.8 PG (ref 26–34)
MCHC RBC AUTO-ENTMCNC: 33.3 G/DL (ref 31–36)
MCV RBC AUTO: 89.4 FL (ref 80–100)
PLATELET # BLD AUTO: 197 K/UL (ref 135–450)
PMV BLD AUTO: 7.5 FL (ref 5–10.5)
POTASSIUM SERPL-SCNC: 3.7 MMOL/L (ref 3.5–5.1)
RBC # BLD AUTO: 3.28 M/UL (ref 4–5.2)
SODIUM SERPL-SCNC: 140 MMOL/L (ref 136–145)
WBC # BLD AUTO: 6.2 K/UL (ref 4–11)

## 2024-02-01 PROCEDURE — 51701 INSERT BLADDER CATHETER: CPT

## 2024-02-01 PROCEDURE — 1200000000 HC SEMI PRIVATE

## 2024-02-01 PROCEDURE — 36415 COLL VENOUS BLD VENIPUNCTURE: CPT

## 2024-02-01 PROCEDURE — 2580000003 HC RX 258: Performed by: INTERNAL MEDICINE

## 2024-02-01 PROCEDURE — 83735 ASSAY OF MAGNESIUM: CPT

## 2024-02-01 PROCEDURE — 6370000000 HC RX 637 (ALT 250 FOR IP): Performed by: NURSE PRACTITIONER

## 2024-02-01 PROCEDURE — 6360000002 HC RX W HCPCS: Performed by: STUDENT IN AN ORGANIZED HEALTH CARE EDUCATION/TRAINING PROGRAM

## 2024-02-01 PROCEDURE — 6370000000 HC RX 637 (ALT 250 FOR IP): Performed by: HOSPITALIST

## 2024-02-01 PROCEDURE — 85027 COMPLETE CBC AUTOMATED: CPT

## 2024-02-01 PROCEDURE — 2580000003 HC RX 258

## 2024-02-01 PROCEDURE — 6360000002 HC RX W HCPCS: Performed by: NURSE PRACTITIONER

## 2024-02-01 PROCEDURE — 51798 US URINE CAPACITY MEASURE: CPT

## 2024-02-01 PROCEDURE — 6370000000 HC RX 637 (ALT 250 FOR IP): Performed by: INTERNAL MEDICINE

## 2024-02-01 PROCEDURE — 80048 BASIC METABOLIC PNL TOTAL CA: CPT

## 2024-02-01 RX ORDER — MAGNESIUM SULFATE 1 G/100ML
1000 INJECTION INTRAVENOUS ONCE
Status: COMPLETED | OUTPATIENT
Start: 2024-02-01 | End: 2024-02-01

## 2024-02-01 RX ORDER — QUETIAPINE FUMARATE 50 MG/1
50 TABLET, EXTENDED RELEASE ORAL ONCE
Status: COMPLETED | OUTPATIENT
Start: 2024-02-01 | End: 2024-02-01

## 2024-02-01 RX ORDER — WATER 10 ML/10ML
INJECTION INTRAMUSCULAR; INTRAVENOUS; SUBCUTANEOUS
Status: COMPLETED
Start: 2024-02-01 | End: 2024-02-01

## 2024-02-01 RX ORDER — OLANZAPINE 10 MG/2ML
2.5 INJECTION, POWDER, FOR SOLUTION INTRAMUSCULAR ONCE
Status: COMPLETED | OUTPATIENT
Start: 2024-02-01 | End: 2024-02-01

## 2024-02-01 RX ADMIN — Medication: at 09:09

## 2024-02-01 RX ADMIN — PRAMIPEXOLE DIHYDROCHLORIDE 0.25 MG: 0.25 TABLET ORAL at 21:05

## 2024-02-01 RX ADMIN — Medication: at 20:57

## 2024-02-01 RX ADMIN — MAGNESIUM SULFATE HEPTAHYDRATE 1000 MG: 1 INJECTION, SOLUTION INTRAVENOUS at 13:39

## 2024-02-01 RX ADMIN — SODIUM CHLORIDE, PRESERVATIVE FREE 10 ML: 5 INJECTION INTRAVENOUS at 09:09

## 2024-02-01 RX ADMIN — WATER 10 ML: 1 INJECTION INTRAMUSCULAR; INTRAVENOUS; SUBCUTANEOUS at 02:55

## 2024-02-01 RX ADMIN — DIVALPROEX SODIUM 125 MG: 125 CAPSULE, COATED PELLETS ORAL at 00:32

## 2024-02-01 RX ADMIN — OLANZAPINE 2.5 MG: 10 INJECTION, POWDER, FOR SOLUTION INTRAMUSCULAR at 02:55

## 2024-02-01 RX ADMIN — QUETIAPINE FUMARATE 50 MG: 50 TABLET, EXTENDED RELEASE ORAL at 01:23

## 2024-02-01 RX ADMIN — METOPROLOL SUCCINATE 50 MG: 50 TABLET, EXTENDED RELEASE ORAL at 21:05

## 2024-02-01 NOTE — PROGRESS NOTES
Physical/Occupational Therapy  Attempted to see pt this afternoon. Pt hold due to sleepiness/lack of alertness. Pt unable to safely participate in therapy this date.   Will re-attempt at a later date         Joyce Tadeo, PT  Trisha William OTR/L

## 2024-02-01 NOTE — PROGRESS NOTES
..  Hospital Medicine Progress Note      Date of Admission: 2024  Hospital Day: 4    Chief Admission Complaint:  VANCE     Subjective:  Patient is in bed awake and alert. Unable to communicate ROS fully due to baseline dementia. Nursing staff informed me she got a little combative ON and was given Zyprexa and Olanzapine.     Presenting Admission History:       The patient is a pleasant 89 Y F who seems to have a history of dementia.  Background info is extremely limited.  She has no PMH listed in her [facility] ED paperwork.  There appear to be no previous Epic encounters with her.  We have no contact information for her family.  Apparently she has lived with family \"for the last 3 months.\"  She tells me that her   at some point.  She is completely disoriented, has no insight.              Records from [facility] say that she presented because after urinating family saw \"blood in the toilet.\"  Family reportedly thought it was vaginal bleeding.  The patient said that she has a h/o dysfunctional uterine bleeding but refused a hysterectomy in the past.  Hb was 10.  The ED doc performed a vaginal exam with a speculum and did not find any evidence of vaginal bleeding.  They also performed a rectal exam and found normal brown stool, though it did test positive for occult blood.  Her urine wasn't bloody.  During the exam they noticed her multiple sacral pressure wounds and areas of skin breakdown on her buttock.  One of these areas was bleeding, and they presumed that this was the actual source of the blood loss.  It stopped bleeding with time.  The wounds didn't look infected.  She didn't have a leukocytosis or fever.  The [facility] note lists rivaroxaban in her medication list (indication unknown), but then they write that she \"doesn't take any blood thinners.\"                Regardless of the possible bleeding issue, the patient was found to have a BUN / Cr of 118 / 3.7.  Cr was reportedly 1.5 in 2023.  \"INR\", \"LACTA\", \"TSH\" in the last 72 hours.    Urine Cultures:   Lab Results   Component Value Date/Time    LABURIN No growth at 18 to 36 hours 01/30/2024 01:00 AM     Blood Cultures: No results found for: \"BC\"  No results found for: \"BLOODCULT2\"  Organism: No results found for: \"ORG\"      Aj Bashir, DO

## 2024-02-01 NOTE — PLAN OF CARE
Problem: Discharge Planning  Goal: Discharge to home or other facility with appropriate resources  Outcome: Progressing     Problem: Skin/Tissue Integrity  Goal: Absence of new skin breakdown  Description: 1.  Monitor for areas of redness and/or skin breakdown  2.  Assess vascular access sites hourly  3.  Every 4-6 hours minimum:  Change oxygen saturation probe site  4.  Every 4-6 hours:  If on nasal continuous positive airway pressure, respiratory therapy assess nares and determine need for appliance change or resting period.  Outcome: Progressing     Problem: ABCDS Injury Assessment  Goal: Absence of physical injury  Outcome: Progressing     Problem: Confusion  Goal: Confusion, delirium, dementia, or psychosis is improved or at baseline  Description: INTERVENTIONS:  1. Assess for possible contributors to thought disturbance, including medications, impaired vision or hearing, underlying metabolic abnormalities, dehydration, psychiatric diagnoses, and notify attending LIP  2. [place] high risk fall precautions, as indicated  3. Provide frequent short contacts to provide reality reorientation, refocusing and direction  4. Decrease environmental stimuli, including noise as appropriate  5. Monitor and intervene to maintain adequate nutrition, hydration, elimination, sleep and activity  6. If unable to ensure safety without constant attention obtain sitter and review sitter guidelines with assigned personnel  7. Initiate Psychosocial CNS and Spiritual Care consult, as indicated  Outcome: Progressing     Problem: Safety - Adult  Goal: Free from fall injury  Outcome: Progressing     Problem: Neurosensory - Adult  Goal: Achieves stable or improved neurological status  Outcome: Progressing  Flowsheets (Taken 2/1/2024 1128)  Achieves stable or improved neurological status: Assess for and report changes in neurological status     Problem: Cardiovascular - Adult  Goal: Maintains optimal cardiac output and hemodynamic

## 2024-02-01 NOTE — PROGRESS NOTES
Patient confused and keeps on insisting to get out of bed and agitated. Depakote given earlier and I also requested something to make her sleep. NP ordered Seroquel but not helping patient. Messaged NP again for another medication.

## 2024-02-02 VITALS
HEART RATE: 103 BPM | BODY MASS INDEX: 21.99 KG/M2 | SYSTOLIC BLOOD PRESSURE: 96 MMHG | TEMPERATURE: 97.3 F | DIASTOLIC BLOOD PRESSURE: 60 MMHG | RESPIRATION RATE: 18 BRPM | WEIGHT: 119.5 LBS | OXYGEN SATURATION: 100 % | HEIGHT: 62 IN

## 2024-02-02 LAB
ANION GAP SERPL CALCULATED.3IONS-SCNC: 10 MMOL/L (ref 3–16)
BUN SERPL-MCNC: 18 MG/DL (ref 7–20)
CALCIUM SERPL-MCNC: 8.9 MG/DL (ref 8.3–10.6)
CHLORIDE SERPL-SCNC: 102 MMOL/L (ref 99–110)
CO2 SERPL-SCNC: 25 MMOL/L (ref 21–32)
CREAT SERPL-MCNC: 1.1 MG/DL (ref 0.6–1.2)
DEPRECATED RDW RBC AUTO: 15.9 % (ref 12.4–15.4)
GFR SERPLBLD CREATININE-BSD FMLA CKD-EPI: 47 ML/MIN/{1.73_M2}
GLUCOSE SERPL-MCNC: 152 MG/DL (ref 70–99)
HCT VFR BLD AUTO: 31.2 % (ref 36–48)
HGB BLD-MCNC: 10.5 G/DL (ref 12–16)
MAGNESIUM SERPL-MCNC: 1.7 MG/DL (ref 1.8–2.4)
MCH RBC QN AUTO: 30 PG (ref 26–34)
MCHC RBC AUTO-ENTMCNC: 33.5 G/DL (ref 31–36)
MCV RBC AUTO: 89.4 FL (ref 80–100)
PLATELET # BLD AUTO: 198 K/UL (ref 135–450)
PMV BLD AUTO: 8.1 FL (ref 5–10.5)
POTASSIUM SERPL-SCNC: 3.5 MMOL/L (ref 3.5–5.1)
RBC # BLD AUTO: 3.49 M/UL (ref 4–5.2)
SODIUM SERPL-SCNC: 137 MMOL/L (ref 136–145)
WBC # BLD AUTO: 6.3 K/UL (ref 4–11)

## 2024-02-02 PROCEDURE — 6370000000 HC RX 637 (ALT 250 FOR IP): Performed by: INTERNAL MEDICINE

## 2024-02-02 PROCEDURE — 80048 BASIC METABOLIC PNL TOTAL CA: CPT

## 2024-02-02 PROCEDURE — 6360000002 HC RX W HCPCS: Performed by: STUDENT IN AN ORGANIZED HEALTH CARE EDUCATION/TRAINING PROGRAM

## 2024-02-02 PROCEDURE — 85027 COMPLETE CBC AUTOMATED: CPT

## 2024-02-02 PROCEDURE — 36415 COLL VENOUS BLD VENIPUNCTURE: CPT

## 2024-02-02 PROCEDURE — 6370000000 HC RX 637 (ALT 250 FOR IP): Performed by: NURSE PRACTITIONER

## 2024-02-02 PROCEDURE — 83735 ASSAY OF MAGNESIUM: CPT

## 2024-02-02 PROCEDURE — 6370000000 HC RX 637 (ALT 250 FOR IP): Performed by: HOSPITALIST

## 2024-02-02 PROCEDURE — 2580000003 HC RX 258: Performed by: INTERNAL MEDICINE

## 2024-02-02 RX ORDER — MAGNESIUM SULFATE IN WATER 40 MG/ML
2000 INJECTION, SOLUTION INTRAVENOUS ONCE
Status: COMPLETED | OUTPATIENT
Start: 2024-02-02 | End: 2024-02-02

## 2024-02-02 RX ORDER — VALSARTAN AND HYDROCHLOROTHIAZIDE 160; 12.5 MG/1; MG/1
1 TABLET, FILM COATED ORAL DAILY
Qty: 30 TABLET | Refills: 0 | Status: SHIPPED | OUTPATIENT
Start: 2024-02-02

## 2024-02-02 RX ORDER — LEVOTHYROXINE SODIUM 88 UG/1
88 TABLET ORAL DAILY
Qty: 30 TABLET | Refills: 0 | Status: SHIPPED | OUTPATIENT
Start: 2024-02-02

## 2024-02-02 RX ORDER — METOPROLOL SUCCINATE 50 MG/1
50 TABLET, EXTENDED RELEASE ORAL DAILY
Qty: 30 TABLET | Refills: 0 | Status: SHIPPED | OUTPATIENT
Start: 2024-02-02

## 2024-02-02 RX ORDER — PRAMIPEXOLE DIHYDROCHLORIDE 0.25 MG/1
0.25 TABLET ORAL 3 TIMES DAILY
Qty: 90 TABLET | Refills: 0 | Status: SHIPPED | OUTPATIENT
Start: 2024-02-02

## 2024-02-02 RX ORDER — QUETIAPINE FUMARATE 50 MG/1
50 TABLET, EXTENDED RELEASE ORAL ONCE
Status: COMPLETED | OUTPATIENT
Start: 2024-02-02 | End: 2024-02-02

## 2024-02-02 RX ADMIN — PRAMIPEXOLE DIHYDROCHLORIDE 0.25 MG: 0.25 TABLET ORAL at 07:43

## 2024-02-02 RX ADMIN — LEVOTHYROXINE SODIUM 88 MCG: 88 TABLET ORAL at 06:23

## 2024-02-02 RX ADMIN — ACETAMINOPHEN 650 MG: 325 TABLET ORAL at 01:42

## 2024-02-02 RX ADMIN — MAGNESIUM SULFATE HEPTAHYDRATE 2000 MG: 40 INJECTION, SOLUTION INTRAVENOUS at 08:30

## 2024-02-02 RX ADMIN — Medication: at 07:43

## 2024-02-02 RX ADMIN — SODIUM CHLORIDE, PRESERVATIVE FREE 10 ML: 5 INJECTION INTRAVENOUS at 07:44

## 2024-02-02 RX ADMIN — DIVALPROEX SODIUM 125 MG: 125 CAPSULE, COATED PELLETS ORAL at 01:42

## 2024-02-02 RX ADMIN — QUETIAPINE FUMARATE 50 MG: 50 TABLET, EXTENDED RELEASE ORAL at 05:13

## 2024-02-02 NOTE — PLAN OF CARE
Problem: Discharge Planning  Goal: Discharge to home or other facility with appropriate resources  2/2/2024 1014 by Kori Jaffe RN  Outcome: Adequate for Discharge  2/2/2024 1009 by Kori Jaffe RN  Outcome: Progressing     Problem: Skin/Tissue Integrity  Goal: Absence of new skin breakdown  Description: 1.  Monitor for areas of redness and/or skin breakdown  2.  Assess vascular access sites hourly  3.  Every 4-6 hours minimum:  Change oxygen saturation probe site  4.  Every 4-6 hours:  If on nasal continuous positive airway pressure, respiratory therapy assess nares and determine need for appliance change or resting period.  2/2/2024 1014 by Kori Jaffe RN  Outcome: Adequate for Discharge  2/2/2024 1009 by Kori Jaffe RN  Outcome: Progressing     Problem: ABCDS Injury Assessment  Goal: Absence of physical injury  2/2/2024 1014 by Kori Jaffe RN  Outcome: Adequate for Discharge  2/2/2024 1009 by Kori Jaffe RN  Outcome: Progressing     Problem: Confusion  Goal: Confusion, delirium, dementia, or psychosis is improved or at baseline  Description: INTERVENTIONS:  1. Assess for possible contributors to thought disturbance, including medications, impaired vision or hearing, underlying metabolic abnormalities, dehydration, psychiatric diagnoses, and notify attending LIP  2. [place] high risk fall precautions, as indicated  3. Provide frequent short contacts to provide reality reorientation, refocusing and direction  4. Decrease environmental stimuli, including noise as appropriate  5. Monitor and intervene to maintain adequate nutrition, hydration, elimination, sleep and activity  6. If unable to ensure safety without constant attention obtain sitter and review sitter guidelines with assigned personnel  7. Initiate Psychosocial CNS and Spiritual Care consult, as indicated  2/2/2024 1014 by Kori Jaffe RN  Outcome: Adequate for Discharge  2/2/2024 1009 by Kori Jaffe

## 2024-02-02 NOTE — PROGRESS NOTES
Patient discharged. IV removed, telemetry box and leads removed and returned. Lockbox emptied. All belongings gathered and returned to patient. Discharge instructions reviewed with patient, all questions answered by RN. Medications picked up from outpatient pharmacy / prescriptions sent with patient.  No further needs.

## 2024-02-02 NOTE — PLAN OF CARE
Problem: Discharge Planning  Goal: Discharge to home or other facility with appropriate resources  Outcome: Progressing     Problem: Skin/Tissue Integrity  Goal: Absence of new skin breakdown  Description: 1.  Monitor for areas of redness and/or skin breakdown  2.  Assess vascular access sites hourly  3.  Every 4-6 hours minimum:  Change oxygen saturation probe site  4.  Every 4-6 hours:  If on nasal continuous positive airway pressure, respiratory therapy assess nares and determine need for appliance change or resting period.  Outcome: Progressing     Problem: ABCDS Injury Assessment  Goal: Absence of physical injury  Outcome: Progressing     Problem: Confusion  Goal: Confusion, delirium, dementia, or psychosis is improved or at baseline  Description: INTERVENTIONS:  1. Assess for possible contributors to thought disturbance, including medications, impaired vision or hearing, underlying metabolic abnormalities, dehydration, psychiatric diagnoses, and notify attending LIP  2. [place] high risk fall precautions, as indicated  3. Provide frequent short contacts to provide reality reorientation, refocusing and direction  4. Decrease environmental stimuli, including noise as appropriate  5. Monitor and intervene to maintain adequate nutrition, hydration, elimination, sleep and activity  6. If unable to ensure safety without constant attention obtain sitter and review sitter guidelines with assigned personnel  7. Initiate Psychosocial CNS and Spiritual Care consult, as indicated  Outcome: Progressing     Problem: Safety - Adult  Goal: Free from fall injury  Outcome: Progressing     Problem: Neurosensory - Adult  Goal: Achieves stable or improved neurological status  Outcome: Progressing  Flowsheets (Taken 2/2/2024 4860)  Achieves stable or improved neurological status: Assess for and report changes in neurological status     Problem: Cardiovascular - Adult  Goal: Maintains optimal cardiac output and hemodynamic

## 2024-02-02 NOTE — DISCHARGE SUMMARY
Hospital Medicine Discharge Summary    Patient: Jes Garcia   : 1932     Admit Date: 2024   Discharge Date: 2024    Disposition:  []Home   []HHC  [x]SNF  []ECF  []Acute Rehab  []LTAC  []Hospice  Code status:  [x]Full  []DNR/CCA  []Limited (DNR/CCA with Do Not Intubate)  []DNRCC  Condition at Discharge: Stable  Primary Care Provider: No primary care provider on file.    Admitting Provider: Karthikeyan Mccauley MD  Discharge Provider: Aj Bashir DO     Discharge Diagnoses:      Active Hospital Problems    Diagnosis     Generalized weakness [R53.1]     Dementia ([facility]) [F03.90]     VANCE (acute kidney injury) ([facility]) [N17.9]        Presenting Admission History:      The patient is a pleasant 89 Y F who seems to have a history of dementia.  Background info is extremely limited.  She has no PMH listed in her [facility] ED paperwork.  There appear to be no previous Epic encounters with her.  We have no contact information for her family.  Apparently she has lived with family \"for the last 3 months.\"  She tells me that her   at some point.  She is completely disoriented, has no insight.              Records from [facility] say that she presented because after urinating family saw \"blood in the toilet.\"  Family reportedly thought it was vaginal bleeding.  The patient said that she has a h/o dysfunctional uterine bleeding but refused a hysterectomy in the past.  Hb was 10.  The ED doc performed a vaginal exam with a speculum and did not find any evidence of vaginal bleeding.  They also performed a rectal exam and found normal brown stool, though it did test positive for occult blood.  Her urine wasn't bloody.  During the exam they noticed her multiple sacral pressure wounds and areas of skin breakdown on her buttock.  One of these areas was bleeding, and they presumed that this was the actual source of the blood loss.  It stopped bleeding with time.  The wounds didn't look infected.  She didn't

## 2024-02-02 NOTE — CARE COORDINATION
CASE MANAGEMENT DISCHARGE SUMMARY      Discharge to: [facility] SNF    Precertification completed: N/A  Hospital Exemption Notification (HENS) completed: N/A from [place]    IMM given: 1/31/2024    Transportation:       Medical Transport explained to pt/family. Pt/family voice no agency preference.    Agency used: [facility] transport   time: 1200   Ambulance form completed: Yes    Confirmed discharge plan with:     Patient: yes     Family:  yes, message left for nephew Felipe     Facility/Agency, name:  [facility] faxed   Phone number for report to facility: [phone]     RN, name: Kori    Note: Discharging nurse to complete [facility], reconcile [facility], and place final copy with patient's discharge packet. RN to ensure that written prescriptions for  Level II medications are sent with patient to the facility as per protocol.    Cristin Ruth RN     
Chart reviewed day 2. Care provided by IM. Pt is from home with her nephew and sister. The nephew and sister feel like they are no longer able to take care of her appropriately. She has recs for SNF and the family prefers [facility] in [place]. She has been accepting bending insurance. Apparently [facility] changed their name recently and are out of network with [employer]. They tell me they should be in network starting 2/1. They did run her medicaid and apparently it has no been active since 2017. They can take her medicaid pending. They are working on getting her application for medicaid. BUN and creat 37 and 1.3. Will continue to follow course for needs. Cristin Ruth RN   
Chart reviewed day 3. Care provided by IM. Pt is from [facility]. She has been accepted at [facility] for SNF. Will need transport when she discharges. Mag low today at 1.6 and is getting replacment. Pt will likely D/C tomorrow. Cristin Ruth RN   
    Financial    Payor: [employer] MEDICARE / Plan: [employer] CHOICE-PPO MEDICARE / Product Type: *No Product type* /     Does insurance require precert for SNF: Yes    Potential assistance Purchasing Medications:    Meds-to-Beds request: Yes    No Pharmacies Listed    Notes:    Factors facilitating achievement of predicted outcomes: Pleasant    Barriers to discharge: Limited family support, Confusion, Cognitive deficit, Limited insight into deficits, Communication deficit, and Medical complications    Additional Case Management Notes: Pt lives with her nephew and her sister. I spoke with the nephew over the phone (Felipe [phone]), he states he has POA. He states the pt had been able to take care of her self until recently she has been declining. The sister helps to bathe the pt and she noticed the sores on her coccyx after they were bleeding. That is what brought them into the hospital at [facility] in [place]. The family would like me to make a referral to [facility] in [place]. I made a referral with their admissions Liaison Heather [phone]. I faxed the requested info to [phone]. She will review the records and let me know if they can accept. Will continue to follow course for needs. Cristin Ruth RN     The Plan for Transition of Care is related to the following treatment goals of VANCE (acute kidney injury) (HCC) [N17.9]    IF APPLICABLE: The Patient and/or patient representative Jes and her family were provided with a choice of provider and agrees with the discharge plan. Freedom of choice list with basic dialogue that supports the patient's individualized plan of care/goals and shares the quality data associated with the providers was provided to:     Patient Representative Name:       The Patient and/or Patient Representative Agree with the Discharge Plan?      Cristin Ruth RN  Case Management Department

## 2024-02-02 NOTE — DISCHARGE INSTR - COC
Continuity of Care Form    Patient Name: Jes Garcia   :  1932  MRN:  [id number]    Admit date:  2024  Discharge date:  24    Code Status Order: Full Code   Advance Directives:     Admitting Physician:  Karthikeyan Mccauley MD  PCP: No primary care provider on file.    Discharging Nurse: [facility] Unit/Room#: 0366/0366-01  Discharging Unit Phone Number: [phone]    Emergency Contact:   Extended Emergency Contact Information  Primary Emergency Contact: Felipe Biswas  Mobile Phone: [phone]  Relation: Niece/Nephew   needed? No  Secondary Emergency Contact: Frannie Mayer  Home Phone: [phone]  Relation: Brother/Sister   needed? No    Past Surgical History:  No past surgical history on file.    Immunization History:     There is no immunization history on file for this patient.    Active Problems:  Patient Active Problem List   Diagnosis Code    VANCE (acute kidney injury) ([facility]) N17.9    Generalized weakness R53.1    Dementia ([facility]) F03.90       Isolation/Infection:   Isolation            No Isolation          Patient Infection Status       None to display            Nurse Assessment:  Last Vital Signs: BP 96/60   Pulse (!) 103   Temp 97.3 °F (36.3 °C) (Oral)   Resp 18   Ht 1.575 m (5' 2\")   Wt 54.2 kg (119 lb 8 oz)   SpO2 100%   BMI 21.86 kg/m²     Last documented pain score (0-10 scale):    Last Weight:   Wt Readings from Last 1 Encounters:   24 54.2 kg (119 lb 8 oz)     Mental Status:  disoriented    IV Access:  - None    Nursing Mobility/ADLs:  Walking   Dependent  Transfer  Dependent  Bathing  Dependent  Dressing  Dependent  Toileting  Dependent  Feeding  Independent  Med Admin  Assisted  Med Delivery   none    Wound Care Documentation and Therapy:  Wound 24 Sacrum Five stage 2 pressure injuries on coccyx (Active)   Wound Image   24 1358   Wound Etiology Pressure Stage 3 24   Dressing Status Clean;Dry;Intact

## 2024-03-09 ENCOUNTER — HOSPITAL ENCOUNTER (INPATIENT)
Age: 89
LOS: 3 days | Discharge: HOSPICE | DRG: 193 | End: 2024-03-12
Attending: STUDENT IN AN ORGANIZED HEALTH CARE EDUCATION/TRAINING PROGRAM | Admitting: INTERNAL MEDICINE
Payer: MEDICARE

## 2024-03-09 DIAGNOSIS — F03.90 DEMENTIA, UNSPECIFIED DEMENTIA SEVERITY, UNSPECIFIED DEMENTIA TYPE, UNSPECIFIED WHETHER BEHAVIORAL, PSYCHOTIC, OR MOOD DISTURBANCE OR ANXIETY (HCC): Primary | ICD-10-CM

## 2024-03-09 PROBLEM — J18.9 PNEUMONIA DUE TO INFECTIOUS ORGANISM, UNSPECIFIED LATERALITY, UNSPECIFIED PART OF LUNG: Status: ACTIVE | Noted: 2024-03-09

## 2024-03-09 PROBLEM — A41.9 SEPSIS (HCC): Status: ACTIVE | Noted: 2024-03-09

## 2024-03-09 PROBLEM — J18.9 PNEUMONIA DUE TO INFECTIOUS ORGANISM: Status: ACTIVE | Noted: 2024-03-09

## 2024-03-09 LAB
ALBUMIN SERPL-MCNC: 2.1 G/DL (ref 3.4–5)
ALBUMIN/GLOB SERPL: 0.8 {RATIO} (ref 1.1–2.2)
ALP SERPL-CCNC: 88 U/L (ref 40–129)
ALT SERPL-CCNC: 14 U/L (ref 10–40)
ANION GAP SERPL CALCULATED.3IONS-SCNC: 11 MMOL/L (ref 3–16)
AST SERPL-CCNC: 21 U/L (ref 15–37)
BASOPHILS # BLD: 0 K/UL (ref 0–0.2)
BASOPHILS NFR BLD: 0.2 %
BILIRUB SERPL-MCNC: 0.5 MG/DL (ref 0–1)
BUN SERPL-MCNC: 69 MG/DL (ref 7–20)
C DIFF TOX A+B STL QL IA: NORMAL
CALCIUM SERPL-MCNC: 7.5 MG/DL (ref 8.3–10.6)
CHLORIDE SERPL-SCNC: 117 MMOL/L (ref 99–110)
CO2 SERPL-SCNC: 18 MMOL/L (ref 21–32)
CREAT SERPL-MCNC: 1.8 MG/DL (ref 0.6–1.2)
DEPRECATED RDW RBC AUTO: 18.1 % (ref 12.4–15.4)
EOSINOPHIL # BLD: 0 K/UL (ref 0–0.6)
EOSINOPHIL NFR BLD: 0.1 %
GFR SERPLBLD CREATININE-BSD FMLA CKD-EPI: 26 ML/MIN/{1.73_M2}
GLUCOSE SERPL-MCNC: 82 MG/DL (ref 70–99)
HCT VFR BLD AUTO: 27.2 % (ref 36–48)
HGB BLD-MCNC: 9 G/DL (ref 12–16)
LYMPHOCYTES # BLD: 0.6 K/UL (ref 1–5.1)
LYMPHOCYTES NFR BLD: 5.3 %
MAGNESIUM SERPL-MCNC: 2.2 MG/DL (ref 1.8–2.4)
MCH RBC QN AUTO: 30.1 PG (ref 26–34)
MCHC RBC AUTO-ENTMCNC: 33 G/DL (ref 31–36)
MCV RBC AUTO: 91 FL (ref 80–100)
MONOCYTES # BLD: 0.2 K/UL (ref 0–1.3)
MONOCYTES NFR BLD: 1.8 %
NEUTROPHILS # BLD: 9.8 K/UL (ref 1.7–7.7)
NEUTROPHILS NFR BLD: 92.6 %
PLATELET # BLD AUTO: 147 K/UL (ref 135–450)
PMV BLD AUTO: 8.8 FL (ref 5–10.5)
POTASSIUM SERPL-SCNC: 3 MMOL/L (ref 3.5–5.1)
PROT SERPL-MCNC: 4.7 G/DL (ref 6.4–8.2)
RBC # BLD AUTO: 2.99 M/UL (ref 4–5.2)
SODIUM SERPL-SCNC: 146 MMOL/L (ref 136–145)
WBC # BLD AUTO: 10.6 K/UL (ref 4–11)

## 2024-03-09 PROCEDURE — 6360000002 HC RX W HCPCS: Performed by: INTERNAL MEDICINE

## 2024-03-09 PROCEDURE — 87449 NOS EACH ORGANISM AG IA: CPT

## 2024-03-09 PROCEDURE — 87324 CLOSTRIDIUM AG IA: CPT

## 2024-03-09 PROCEDURE — 94760 N-INVAS EAR/PLS OXIMETRY 1: CPT

## 2024-03-09 PROCEDURE — 6370000000 HC RX 637 (ALT 250 FOR IP): Performed by: INTERNAL MEDICINE

## 2024-03-09 PROCEDURE — 2580000003 HC RX 258: Performed by: INTERNAL MEDICINE

## 2024-03-09 PROCEDURE — 36415 COLL VENOUS BLD VENIPUNCTURE: CPT

## 2024-03-09 PROCEDURE — 85025 COMPLETE CBC W/AUTO DIFF WBC: CPT

## 2024-03-09 PROCEDURE — 80053 COMPREHEN METABOLIC PANEL: CPT

## 2024-03-09 PROCEDURE — 87493 C DIFF AMPLIFIED PROBE: CPT

## 2024-03-09 PROCEDURE — 83735 ASSAY OF MAGNESIUM: CPT

## 2024-03-09 PROCEDURE — 2700000000 HC OXYGEN THERAPY PER DAY

## 2024-03-09 PROCEDURE — 1200000000 HC SEMI PRIVATE

## 2024-03-09 RX ORDER — METOPROLOL SUCCINATE 50 MG/1
50 TABLET, EXTENDED RELEASE ORAL DAILY
Status: DISCONTINUED | OUTPATIENT
Start: 2024-03-09 | End: 2024-03-10

## 2024-03-09 RX ORDER — VALSARTAN 80 MG/1
160 TABLET ORAL DAILY
Status: DISCONTINUED | OUTPATIENT
Start: 2024-03-09 | End: 2024-03-10

## 2024-03-09 RX ORDER — HYDROCHLOROTHIAZIDE 25 MG/1
12.5 TABLET ORAL DAILY
Status: DISCONTINUED | OUTPATIENT
Start: 2024-03-09 | End: 2024-03-10

## 2024-03-09 RX ORDER — VALSARTAN AND HYDROCHLOROTHIAZIDE 160; 12.5 MG/1; MG/1
1 TABLET, FILM COATED ORAL DAILY
Status: DISCONTINUED | OUTPATIENT
Start: 2024-03-09 | End: 2024-03-09 | Stop reason: CLARIF

## 2024-03-09 RX ORDER — ACETAMINOPHEN 325 MG/1
650 TABLET ORAL EVERY 6 HOURS PRN
Status: DISCONTINUED | OUTPATIENT
Start: 2024-03-09 | End: 2024-03-12 | Stop reason: HOSPADM

## 2024-03-09 RX ORDER — QUETIAPINE FUMARATE 25 MG/1
25 TABLET, FILM COATED ORAL 2 TIMES DAILY
Status: DISCONTINUED | OUTPATIENT
Start: 2024-03-09 | End: 2024-03-10

## 2024-03-09 RX ORDER — ACETAMINOPHEN 650 MG/1
650 SUPPOSITORY RECTAL EVERY 6 HOURS PRN
Status: DISCONTINUED | OUTPATIENT
Start: 2024-03-09 | End: 2024-03-12 | Stop reason: HOSPADM

## 2024-03-09 RX ORDER — ONDANSETRON 2 MG/ML
4 INJECTION INTRAMUSCULAR; INTRAVENOUS EVERY 6 HOURS PRN
Status: DISCONTINUED | OUTPATIENT
Start: 2024-03-09 | End: 2024-03-12 | Stop reason: HOSPADM

## 2024-03-09 RX ORDER — M-VIT,TX,IRON,MINS/CALC/FOLIC 27MG-0.4MG
1 TABLET ORAL DAILY
Status: ON HOLD | COMMUNITY
End: 2024-03-10 | Stop reason: HOSPADM

## 2024-03-09 RX ORDER — POLYETHYLENE GLYCOL 3350 17 G/17G
17 POWDER, FOR SOLUTION ORAL DAILY PRN
Status: DISCONTINUED | OUTPATIENT
Start: 2024-03-09 | End: 2024-03-12 | Stop reason: HOSPADM

## 2024-03-09 RX ORDER — LEVOTHYROXINE SODIUM 88 UG/1
88 TABLET ORAL DAILY
Status: DISCONTINUED | OUTPATIENT
Start: 2024-03-09 | End: 2024-03-10

## 2024-03-09 RX ORDER — POTASSIUM CHLORIDE 7.45 MG/ML
10 INJECTION INTRAVENOUS PRN
Status: DISCONTINUED | OUTPATIENT
Start: 2024-03-09 | End: 2024-03-09

## 2024-03-09 RX ORDER — QUETIAPINE FUMARATE 25 MG/1
25 TABLET, FILM COATED ORAL 2 TIMES DAILY
Status: ON HOLD | COMMUNITY
End: 2024-03-10 | Stop reason: HOSPADM

## 2024-03-09 RX ORDER — ONDANSETRON 4 MG/1
4 TABLET, ORALLY DISINTEGRATING ORAL EVERY 8 HOURS PRN
Status: DISCONTINUED | OUTPATIENT
Start: 2024-03-09 | End: 2024-03-12 | Stop reason: HOSPADM

## 2024-03-09 RX ORDER — SODIUM CHLORIDE 0.9 % (FLUSH) 0.9 %
5-40 SYRINGE (ML) INJECTION EVERY 12 HOURS SCHEDULED
Status: DISCONTINUED | OUTPATIENT
Start: 2024-03-09 | End: 2024-03-10

## 2024-03-09 RX ORDER — POTASSIUM CHLORIDE 20 MEQ/1
40 TABLET, EXTENDED RELEASE ORAL ONCE
Status: COMPLETED | OUTPATIENT
Start: 2024-03-09 | End: 2024-03-09

## 2024-03-09 RX ORDER — SODIUM CHLORIDE 9 MG/ML
INJECTION, SOLUTION INTRAVENOUS PRN
Status: DISCONTINUED | OUTPATIENT
Start: 2024-03-09 | End: 2024-03-12 | Stop reason: HOSPADM

## 2024-03-09 RX ORDER — MAGNESIUM SULFATE IN WATER 40 MG/ML
2000 INJECTION, SOLUTION INTRAVENOUS PRN
Status: DISCONTINUED | OUTPATIENT
Start: 2024-03-09 | End: 2024-03-09

## 2024-03-09 RX ORDER — DIVALPROEX SODIUM 250 MG/1
250 TABLET, EXTENDED RELEASE ORAL EVERY 12 HOURS
Status: ON HOLD | COMMUNITY
End: 2024-03-10 | Stop reason: HOSPADM

## 2024-03-09 RX ORDER — M-VIT,TX,IRON,MINS/CALC/FOLIC 27MG-0.4MG
1 TABLET ORAL DAILY
Status: DISCONTINUED | OUTPATIENT
Start: 2024-03-09 | End: 2024-03-10

## 2024-03-09 RX ORDER — POTASSIUM CHLORIDE 20 MEQ/1
40 TABLET, EXTENDED RELEASE ORAL PRN
Status: DISCONTINUED | OUTPATIENT
Start: 2024-03-09 | End: 2024-03-09

## 2024-03-09 RX ORDER — DIVALPROEX SODIUM 250 MG/1
250 TABLET, EXTENDED RELEASE ORAL EVERY 12 HOURS
Status: DISCONTINUED | OUTPATIENT
Start: 2024-03-09 | End: 2024-03-10

## 2024-03-09 RX ORDER — PRAMIPEXOLE DIHYDROCHLORIDE 0.25 MG/1
0.25 TABLET ORAL 3 TIMES DAILY
Status: DISCONTINUED | OUTPATIENT
Start: 2024-03-09 | End: 2024-03-10

## 2024-03-09 RX ORDER — SODIUM CHLORIDE 0.9 % (FLUSH) 0.9 %
5-40 SYRINGE (ML) INJECTION PRN
Status: DISCONTINUED | OUTPATIENT
Start: 2024-03-09 | End: 2024-03-12 | Stop reason: HOSPADM

## 2024-03-09 RX ORDER — SODIUM CHLORIDE, SODIUM LACTATE, POTASSIUM CHLORIDE, CALCIUM CHLORIDE 600; 310; 30; 20 MG/100ML; MG/100ML; MG/100ML; MG/100ML
INJECTION, SOLUTION INTRAVENOUS CONTINUOUS
Status: DISCONTINUED | OUTPATIENT
Start: 2024-03-09 | End: 2024-03-10

## 2024-03-09 RX ADMIN — WATER 5 MG: 1 INJECTION INTRAMUSCULAR; INTRAVENOUS; SUBCUTANEOUS at 18:38

## 2024-03-09 RX ADMIN — SODIUM CHLORIDE, PRESERVATIVE FREE 10 ML: 5 INJECTION INTRAVENOUS at 11:03

## 2024-03-09 RX ADMIN — DIVALPROEX SODIUM 250 MG: 250 TABLET, EXTENDED RELEASE ORAL at 21:02

## 2024-03-09 RX ADMIN — Medication 1 TABLET: at 10:57

## 2024-03-09 RX ADMIN — LEVOTHYROXINE SODIUM 88 MCG: 0.09 TABLET ORAL at 05:47

## 2024-03-09 RX ADMIN — DIVALPROEX SODIUM 250 MG: 250 TABLET, EXTENDED RELEASE ORAL at 03:20

## 2024-03-09 RX ADMIN — SODIUM CHLORIDE: 9 INJECTION, SOLUTION INTRAVENOUS at 05:02

## 2024-03-09 RX ADMIN — PIPERACILLIN AND TAZOBACTAM 3375 MG: 3; .375 INJECTION, POWDER, LYOPHILIZED, FOR SOLUTION INTRAVENOUS at 05:11

## 2024-03-09 RX ADMIN — PRAMIPEXOLE DIHYDROCHLORIDE 0.25 MG: 0.25 TABLET ORAL at 03:20

## 2024-03-09 RX ADMIN — APIXABAN 2.5 MG: 2.5 TABLET, FILM COATED ORAL at 21:02

## 2024-03-09 RX ADMIN — APIXABAN 2.5 MG: 2.5 TABLET, FILM COATED ORAL at 03:20

## 2024-03-09 RX ADMIN — QUETIAPINE FUMARATE 25 MG: 25 TABLET ORAL at 03:20

## 2024-03-09 RX ADMIN — SODIUM CHLORIDE, POTASSIUM CHLORIDE, SODIUM LACTATE AND CALCIUM CHLORIDE: 600; 310; 30; 20 INJECTION, SOLUTION INTRAVENOUS at 10:46

## 2024-03-09 RX ADMIN — SODIUM CHLORIDE, POTASSIUM CHLORIDE, SODIUM LACTATE AND CALCIUM CHLORIDE: 600; 310; 30; 20 INJECTION, SOLUTION INTRAVENOUS at 22:42

## 2024-03-09 RX ADMIN — METOPROLOL SUCCINATE 50 MG: 50 TABLET, EXTENDED RELEASE ORAL at 10:58

## 2024-03-09 RX ADMIN — POTASSIUM CHLORIDE 40 MEQ: 1500 TABLET, EXTENDED RELEASE ORAL at 10:58

## 2024-03-09 RX ADMIN — PIPERACILLIN AND TAZOBACTAM 3375 MG: 3; .375 INJECTION, POWDER, LYOPHILIZED, FOR SOLUTION INTRAVENOUS at 18:19

## 2024-03-09 RX ADMIN — HYDROCHLOROTHIAZIDE 12.5 MG: 25 TABLET ORAL at 10:58

## 2024-03-09 RX ADMIN — QUETIAPINE FUMARATE 25 MG: 25 TABLET ORAL at 21:02

## 2024-03-09 ASSESSMENT — PAIN SCALES - PAIN ASSESSMENT IN ADVANCED DEMENTIA (PAINAD)
FACIALEXPRESSION: 0
FACIALEXPRESSION: 0
BREATHING: NORMAL
BODYLANGUAGE: RELAXED
BREATHING: 0
BODYLANGUAGE: 0
BREATHING: NORMAL
FACIALEXPRESSION: SMILING OR INEXPRESSIVE
NEGVOCALIZATION: 0
CONSOLABILITY: NO NEED TO CONSOLE
FACIALEXPRESSION: SMILING OR INEXPRESSIVE
CONSOLABILITY: NO NEED TO CONSOLE
BODYLANGUAGE: 0
TOTALSCORE: 0
BODYLANGUAGE: 0
BODYLANGUAGE: 0
BREATHING: NORMAL
BODYLANGUAGE: RELAXED
FACIALEXPRESSION: 0
TOTALSCORE: 0
BREATHING: NORMAL
CONSOLABILITY: NO NEED TO CONSOLE
BODYLANGUAGE: RELAXED
TOTALSCORE: 0
TOTALSCORE: 0
BODYLANGUAGE: RELAXED
FACIALEXPRESSION: SMILING OR INEXPRESSIVE
BODYLANGUAGE: 0
NEGVOCALIZATION: 0
NEGVOCALIZATION: 0
BREATHING: 0
BREATHING: NORMAL
TOTALSCORE: 0
CONSOLABILITY: NO NEED TO CONSOLE
FACIALEXPRESSION: SMILING OR INEXPRESSIVE
FACIALEXPRESSION: SMILING OR INEXPRESSIVE
BODYLANGUAGE: 0
NEGVOCALIZATION: 0
FACIALEXPRESSION: 0
BODYLANGUAGE: RELAXED
TOTALSCORE: 0
CONSOLABILITY: 0
FACIALEXPRESSION: SMILING OR INEXPRESSIVE
BREATHING: NORMAL
CONSOLABILITY: NO NEED TO CONSOLE
BREATHING: 0
BREATHING: NORMAL
CONSOLABILITY: 0
NEGVOCALIZATION: 0
CONSOLABILITY: NO NEED TO CONSOLE
FACIALEXPRESSION: 0
TOTALSCORE: 0
NEGVOCALIZATION: 0
CONSOLABILITY: 0
TOTALSCORE: 0
CONSOLABILITY: 0
BODYLANGUAGE: 0
BREATHING: NORMAL
NEGVOCALIZATION: 0
FACIALEXPRESSION: 0
FACIALEXPRESSION: 0
BREATHING: 0
CONSOLABILITY: 0
FACIALEXPRESSION: SMILING OR INEXPRESSIVE
TOTALSCORE: 0
CONSOLABILITY: 0
BODYLANGUAGE: RELAXED
BREATHING: 0
NEGVOCALIZATION: 0
BREATHING: 0
BREATHING: NORMAL
BREATHING: NORMAL
CONSOLABILITY: 0
CONSOLABILITY: NO NEED TO CONSOLE
BREATHING: 0
FACIALEXPRESSION: SMILING OR INEXPRESSIVE
BREATHING: 0
CONSOLABILITY: 0
NEGVOCALIZATION: 0
FACIALEXPRESSION: SMILING OR INEXPRESSIVE
BODYLANGUAGE: RELAXED
NEGVOCALIZATION: 0
BODYLANGUAGE: 0
CONSOLABILITY: 0
BODYLANGUAGE: 0
FACIALEXPRESSION: 0
FACIALEXPRESSION: SMILING OR INEXPRESSIVE
NEGVOCALIZATION: 0
TOTALSCORE: 0
CONSOLABILITY: NO NEED TO CONSOLE
CONSOLABILITY: 0
FACIALEXPRESSION: 0
FACIALEXPRESSION: SMILING OR INEXPRESSIVE
BREATHING: 0
BODYLANGUAGE: 0
TOTALSCORE: 0
BREATHING: 0
CONSOLABILITY: 0
BREATHING: 0
BODYLANGUAGE: RELAXED
BODYLANGUAGE: RELAXED
CONSOLABILITY: NO NEED TO CONSOLE
FACIALEXPRESSION: 0
BREATHING: NORMAL
FACIALEXPRESSION: 0
BODYLANGUAGE: 0

## 2024-03-09 NOTE — PROGRESS NOTES
4 Eyes Skin Assessment     NAME:  Jes Garcia  YOB: 1932  MEDICAL RECORD NUMBER:  [id number]    The patient is being assessed for  Admission    I agree that at least one RN has performed a thorough Head to Toe Skin Assessment on the patient. ALL assessment sites listed below have been assessed.      Areas assessed by both nurses:    Head, Face, Ears, Shoulders, Back, Chest, Arms, Elbows, Hands, Sacrum. Buttock, Coccyx, Ischium, Legs. Feet and Heels, and Under Medical Devices         Does the Patient have a Wound? No noted wound(s)       Vishal Prevention initiated by RN: Yes  Wound Care Orders initiated by RN: Yes    Pressure Injury (Stage 3,4, Unstageable, DTI, NWPT, and Complex wounds) if present, place Wound referral order by RN under : Yes    New Ostomies, if present place, Ostomy referral order under : No     Nurse 1 eSignature: Electronically signed by Hannah Rojas RN on 3/9/24 at 2:08 AM EST      Nurse 2 eSignature: Electronically signed by Isidra King RN on 3/9/24 at 2:09 AM EST

## 2024-03-09 NOTE — PLAN OF CARE
Problem: Pain  Goal: Verbalizes/displays adequate comfort level or baseline comfort level  Outcome: Progressing     Problem: Safety - Adult  Goal: Free from fall injury  Outcome: Progressing     Problem: ABCDS Injury Assessment  Goal: Absence of physical injury  Outcome: Progressing     Problem: Skin/Tissue Integrity  Goal: Absence of new skin breakdown  Description: 1.  Monitor for areas of redness and/or skin breakdown  2.  Assess vascular access sites hourly  3.  Every 4-6 hours minimum:  Change oxygen saturation probe site  4.  Every 4-6 hours:  If on nasal continuous positive airway pressure, respiratory therapy assess nares and determine need for appliance change or resting period.  Outcome: Progressing

## 2024-03-09 NOTE — SIGNIFICANT EVENT
Patient more agitated  Admitting history reviewed  Will use as needed Zyprexa  Avoid benzodiazepine

## 2024-03-09 NOTE — PROGRESS NOTES
Shift assessment completed. Routine vitals obtained and  stable. Scheduled medications given. Patient is awake, alert and oriented. Respirations are easy and unlabored. Patient does not appear to be in distress, resting comfortably in bed at this time. Call light within reach.

## 2024-03-09 NOTE — H&P
Hospital Medicine History & Physical      Patient Name: Jes Garcia    : 1932    PCP: No primary care provider on file.    Date of Service:  Patient seen and examined on 3/9/2024    Chief Complaint: Altered mental status    History Of Present Illness:    Jes Garcia is a 89 y.o. female with a MH of Mood disturbance, anxiety, major depressive disorder hypertension, hypothyroidism, dementia, atrial fibrillation presents with AMS  Patient is demented, so much of the information is derived from emergency room physician notes, review of the records.  Patient currently resides in nursing home. This morning according to nursing staff, patient was difficult to arouse less responsive to voice and painful stimuli, more confused greater than baseline.  Initial labs show blood pressure of 108/55 pulse of 109 respiration of 19 satting 96% on 4 L NC.  Labs show WBC of 18.8 hemoglobin 11.0 hematocrit of 33.6.  Sodium of 147 chloride of 111, K of 3.7 anion gap of 20.7 bicarb of 19 glucose of 107 BUN of 83 creatinine of 2.6 troponin of 198 proBNP of 289, PT of 16.3 INR 1.6 ABG shows pH of 745 pCO2 of 24.4 pO2 of 100 bicarb of 18.3.  Urinalysis not indicative of UTI.  CT brain showed no acute intracranial abnormality chest x-ray concerning for left lower lobe pneumonia.  She received IV Zosyn, IV vancomycin, 3 L of IV fluid.  Patient is being admitted for further management of her left lower lobe pneumonia    Past Medical History:    Patient has no past medical history on file.    Past Surgical History:    Patient has no past surgical history on file.    Medications Prior to Admission:      Prior to Admission medications    Medication Sig Start Date End Date Taking? Authorizing Provider   apixaban (ELIQUIS) 2.5 MG TABS tablet Take 1 tablet by mouth 2 times daily Every 12 hours   Yes Provider, MD Les   divalproex (DEPAKOTE ER) 250 MG extended release tablet Take 1 tablet by mouth every 12 hours    Yes Provider, MD Les   Multiple Vitamins-Minerals (THERAPEUTIC MULTIVITAMIN-MINERALS) tablet Take 1 tablet by mouth daily   Yes Provider, MD Les   QUEtiapine (SEROQUEL) 25 MG tablet Take 1 tablet by mouth 2 times daily   Yes Provider, MD Les   silver sulfADIAZINE (SILVADENE) 1 % cream Apply 1 each topically daily Apply topically daily.   Yes Les Maldonado MD   rivaroxaban (XARELTO) 15 MG TABS tablet Take 1 tablet by mouth Daily with supper  Patient not taking: Reported on 3/9/2024 2/2/24   Aj Bashir DO   valsartan-hydroCHLOROthiazide (DIOVAN-HCT) 160-12.5 MG per tablet Take 1 tablet by mouth daily 2/2/24   Aj Bashir,    pramipexole (MIRAPEX) 0.25 MG tablet Take 1 tablet by mouth 3 times daily 2/2/24   Aj Bashir,    metoprolol succinate (TOPROL XL) 50 MG extended release tablet Take 1 tablet by mouth daily 2/2/24   Aj Bashir,    levothyroxine (SYNTHROID) 88 MCG tablet Take 1 tablet by mouth Daily 2/2/24   Aj Bashir,        Allergies:  Patient has no known allergies.    Social History:      TOBACCO:   has no history on file for tobacco use.  ETOH:   has no history on file for alcohol use.  DRUGS:  has no history on file for drug use.    Family History:      Reviewed in detail positive as follows:    No family history on file.    REVIEW OF SYSTEMS:   Pertinent positives as noted in the HPI. All other systems reviewed and negative.    PHYSICAL EXAM PERFORMED:    /62   Pulse 70   Resp 18   Ht 1.6 m (5' 3\")   Wt 44.2 kg (97 lb 7.1 oz)   SpO2 91%   BMI 17.26 kg/m²     General appearance:  Awake, alert, no apparent distress, NC oxygen  HEENT:  Normocephalic, atraumatic   Neck: Supple,  Respiratory:  Clear to auscultation bilaterally   Cardiovascular:  Regular rate and rhythm    Abdomen: Soft, NT, ND, without rebound or guarding. Normal bowel sounds.  Extremities:  No clubbing, cyanosis, or edema bilaterally.    Skin: No rashes or lesions.  Warm/dry.  Neurologic:   grossly non-focal. Alert and oriented x 1. Normal speech.      Radiology:     No orders to display         ASSESSMENT/PLAN:  (Body mass index is 17.26 kg/m².)   89 y.o. female with a MH of Mood disturbance, anxiety, major depressive disorder hypertension, hypothyroidism, dementia, atrial fibrillation presents with AMS    # Altered mental status  Encephalopathy most likely due to metabolic versus toxic  Presence of electrolyte imbalance, left lower lobe pneumonia  Treat above conditions monitor mental status for improvement    # Left lower lobe pneumonia  Chest x-ray concerning for left lower lobe pneumonia  Received Zosyn and Vanco    #VANCE  Presence of elevated BUN and creatinine  Received 3 L bolus fluid  Follow-up with BMP in a.m.    #A-fib  Patient on Eliquis./Metroprolol    #Hypertension  Resume home medication-valsartan/hydrochlorothiazide    #Hypothyroidism  Levothyroxine    #Mood disturbance anxiety/major depressive disorder  Resume patient's home medications    DVT prophylaxis: eliquis      Diet: ADULT DIET; Regular  Code Status: Full Code    Consults:  None    Disposition:  Admit to Inpatient   ELOS:  Greater than two midnights due to medical therapy     Please note that portions of this note were completed with a voice recognition program.  Efforts were made to edit the dictations but occasionally words are mis-transcribed.)     Laurent Drummond MD

## 2024-03-10 ENCOUNTER — APPOINTMENT (OUTPATIENT)
Dept: GENERAL RADIOLOGY | Age: 89
DRG: 193 | End: 2024-03-10
Attending: STUDENT IN AN ORGANIZED HEALTH CARE EDUCATION/TRAINING PROGRAM
Payer: MEDICARE

## 2024-03-10 LAB
ANION GAP SERPL CALCULATED.3IONS-SCNC: 13 MMOL/L (ref 3–16)
ANISOCYTOSIS BLD QL SMEAR: ABNORMAL
BASOPHILS # BLD: 0 K/UL (ref 0–0.2)
BASOPHILS NFR BLD: 0 %
BUN SERPL-MCNC: 56 MG/DL (ref 7–20)
BURR CELLS BLD QL SMEAR: ABNORMAL
C DIFF TOX GENS STL QL NAA+PROBE: ABNORMAL
CALCIUM SERPL-MCNC: 8 MG/DL (ref 8.3–10.6)
CHLORIDE SERPL-SCNC: 121 MMOL/L (ref 99–110)
CO2 SERPL-SCNC: 18 MMOL/L (ref 21–32)
CREAT SERPL-MCNC: 1.6 MG/DL (ref 0.6–1.2)
DEPRECATED RDW RBC AUTO: 18.5 % (ref 12.4–15.4)
EOSINOPHIL # BLD: 0 K/UL (ref 0–0.6)
EOSINOPHIL NFR BLD: 0 %
GFR SERPLBLD CREATININE-BSD FMLA CKD-EPI: 30 ML/MIN/{1.73_M2}
GLUCOSE SERPL-MCNC: 76 MG/DL (ref 70–99)
HCT VFR BLD AUTO: 27.1 % (ref 36–48)
HGB BLD-MCNC: 8.9 G/DL (ref 12–16)
LYMPHOCYTES # BLD: 0.3 K/UL (ref 1–5.1)
LYMPHOCYTES NFR BLD: 6 %
MACROCYTES BLD QL SMEAR: ABNORMAL
MCH RBC QN AUTO: 30.1 PG (ref 26–34)
MCHC RBC AUTO-ENTMCNC: 32.9 G/DL (ref 31–36)
MCV RBC AUTO: 91.5 FL (ref 80–100)
MONOCYTES # BLD: 0.1 K/UL (ref 0–1.3)
MONOCYTES NFR BLD: 1 %
NEUTROPHILS # BLD: 4.9 K/UL (ref 1.7–7.7)
NEUTROPHILS NFR BLD: 93 %
ORGANISM: ABNORMAL
PLATELET # BLD AUTO: 137 K/UL (ref 135–450)
PLATELET BLD QL SMEAR: ABNORMAL
PMV BLD AUTO: 8.7 FL (ref 5–10.5)
POTASSIUM SERPL-SCNC: 3.8 MMOL/L (ref 3.5–5.1)
RBC # BLD AUTO: 2.96 M/UL (ref 4–5.2)
SLIDE REVIEW: ABNORMAL
SODIUM SERPL-SCNC: 152 MMOL/L (ref 136–145)
WBC # BLD AUTO: 5.3 K/UL (ref 4–11)

## 2024-03-10 PROCEDURE — 6360000002 HC RX W HCPCS: Performed by: INTERNAL MEDICINE

## 2024-03-10 PROCEDURE — 85025 COMPLETE CBC W/AUTO DIFF WBC: CPT

## 2024-03-10 PROCEDURE — 6370000000 HC RX 637 (ALT 250 FOR IP): Performed by: INTERNAL MEDICINE

## 2024-03-10 PROCEDURE — 2580000003 HC RX 258: Performed by: INTERNAL MEDICINE

## 2024-03-10 PROCEDURE — 80048 BASIC METABOLIC PNL TOTAL CA: CPT

## 2024-03-10 PROCEDURE — 71045 X-RAY EXAM CHEST 1 VIEW: CPT

## 2024-03-10 PROCEDURE — 1200000000 HC SEMI PRIVATE

## 2024-03-10 RX ORDER — MORPHINE SULFATE 2 MG/ML
2 INJECTION, SOLUTION INTRAMUSCULAR; INTRAVENOUS
Status: DISCONTINUED | OUTPATIENT
Start: 2024-03-10 | End: 2024-03-12 | Stop reason: HOSPADM

## 2024-03-10 RX ORDER — DIAZEPAM 5 MG/ML
5 INJECTION, SOLUTION INTRAMUSCULAR; INTRAVENOUS
Status: DISCONTINUED | OUTPATIENT
Start: 2024-03-10 | End: 2024-03-12 | Stop reason: HOSPADM

## 2024-03-10 RX ORDER — VANCOMYCIN HYDROCHLORIDE 125 MG/1
250 CAPSULE ORAL EVERY 6 HOURS SCHEDULED
Status: DISCONTINUED | OUTPATIENT
Start: 2024-03-10 | End: 2024-03-10

## 2024-03-10 RX ORDER — DEXTROSE MONOHYDRATE 50 MG/ML
INJECTION, SOLUTION INTRAVENOUS CONTINUOUS
Status: DISCONTINUED | OUTPATIENT
Start: 2024-03-10 | End: 2024-03-10

## 2024-03-10 RX ORDER — MORPHINE SULFATE 4 MG/ML
4 INJECTION, SOLUTION INTRAMUSCULAR; INTRAVENOUS
Status: DISCONTINUED | OUTPATIENT
Start: 2024-03-10 | End: 2024-03-12 | Stop reason: HOSPADM

## 2024-03-10 RX ADMIN — SODIUM CHLORIDE, PRESERVATIVE FREE 10 ML: 5 INJECTION INTRAVENOUS at 12:00

## 2024-03-10 RX ADMIN — MORPHINE SULFATE 2 MG: 2 INJECTION, SOLUTION INTRAMUSCULAR; INTRAVENOUS at 15:26

## 2024-03-10 RX ADMIN — SODIUM CHLORIDE: 9 INJECTION, SOLUTION INTRAVENOUS at 05:18

## 2024-03-10 RX ADMIN — LEVOTHYROXINE SODIUM 88 MCG: 0.09 TABLET ORAL at 05:21

## 2024-03-10 RX ADMIN — DEXTROSE MONOHYDRATE: 50 INJECTION, SOLUTION INTRAVENOUS at 11:59

## 2024-03-10 RX ADMIN — DIAZEPAM 5 MG: 5 INJECTION, SOLUTION INTRAMUSCULAR; INTRAVENOUS at 15:28

## 2024-03-10 RX ADMIN — PIPERACILLIN AND TAZOBACTAM 3375 MG: 3; .375 INJECTION, POWDER, LYOPHILIZED, FOR SOLUTION INTRAVENOUS at 05:21

## 2024-03-10 RX ADMIN — SODIUM CHLORIDE, PRESERVATIVE FREE 10 ML: 5 INJECTION INTRAVENOUS at 15:25

## 2024-03-10 RX ADMIN — MORPHINE SULFATE 2 MG: 2 INJECTION, SOLUTION INTRAMUSCULAR; INTRAVENOUS at 19:06

## 2024-03-10 ASSESSMENT — PAIN SCALES - PAIN ASSESSMENT IN ADVANCED DEMENTIA (PAINAD)
CONSOLABILITY: 0
NEGVOCALIZATION: 0
BODYLANGUAGE: 0
NEGVOCALIZATION: 0
BREATHING: 0
FACIALEXPRESSION: SMILING OR INEXPRESSIVE
TOTALSCORE: 0
TOTALSCORE: 0
FACIALEXPRESSION: SMILING OR INEXPRESSIVE
TOTALSCORE: 0
FACIALEXPRESSION: SMILING OR INEXPRESSIVE
FACIALEXPRESSION: SMILING OR INEXPRESSIVE
CONSOLABILITY: NO NEED TO CONSOLE
FACIALEXPRESSION: SMILING OR INEXPRESSIVE
FACIALEXPRESSION: 0
CONSOLABILITY: NO NEED TO CONSOLE
CONSOLABILITY: 0
BREATHING: 0
NEGVOCALIZATION: 0
BODYLANGUAGE: 0
NEGVOCALIZATION: 0
BODYLANGUAGE: RELAXED
NEGVOCALIZATION: 0
BREATHING: NORMAL
FACIALEXPRESSION: 0
BREATHING: 0
FACIALEXPRESSION: SMILING OR INEXPRESSIVE
CONSOLABILITY: 0
TOTALSCORE: 0
BODYLANGUAGE: RELAXED
BREATHING: NORMAL
CONSOLABILITY: 0
BREATHING: 0
FACIALEXPRESSION: SMILING OR INEXPRESSIVE
CONSOLABILITY: 0
CONSOLABILITY: 0
CONSOLABILITY: NO NEED TO CONSOLE
CONSOLABILITY: 0
FACIALEXPRESSION: 0
BREATHING: NORMAL
BREATHING: NORMAL
CONSOLABILITY: NO NEED TO CONSOLE
BREATHING: NORMAL
BODYLANGUAGE: 0
CONSOLABILITY: NO NEED TO CONSOLE
BODYLANGUAGE: 0
BREATHING: 0
BODYLANGUAGE: RELAXED
BREATHING: NORMAL
BODYLANGUAGE: RELAXED
FACIALEXPRESSION: SMILING OR INEXPRESSIVE
NEGVOCALIZATION: 0
BREATHING: NORMAL
BODYLANGUAGE: RELAXED
FACIALEXPRESSION: 0
TOTALSCORE: 0
BODYLANGUAGE: 0
BREATHING: NORMAL
NEGVOCALIZATION: 0
BODYLANGUAGE: RELAXED
BODYLANGUAGE: 0
BODYLANGUAGE: 0
BREATHING: 0
CONSOLABILITY: NO NEED TO CONSOLE
FACIALEXPRESSION: 0
TOTALSCORE: 0
CONSOLABILITY: 0
FACIALEXPRESSION: 0
BREATHING: 0
BREATHING: 0
FACIALEXPRESSION: 0
FACIALEXPRESSION: 0
BODYLANGUAGE: RELAXED
BODYLANGUAGE: 0
TOTALSCORE: 0
NEGVOCALIZATION: 0
CONSOLABILITY: NO NEED TO CONSOLE
BODYLANGUAGE: RELAXED
CONSOLABILITY: NO NEED TO CONSOLE
TOTALSCORE: 0

## 2024-03-10 NOTE — PROGRESS NOTES
Family at bedside with patient. Family updated writer with post-mortem arrangements should patient not make it through the night. Patient has pre-arrangements with [facility]. [address] [place] [address] and phone number is [phone].

## 2024-03-10 NOTE — CONSULTS
[facility]   Nephrology progress Note  [phone]  [phone]   [email or web address]         Reason for Consult: Acute kidney injury    HISTORY OF PRESENT ILLNESS:      The patient is a 89 y.o. female with significant past medical history of hypertension, advanced dementia atrial fibrillation on anticoagulation who presents with acute encephalopathy.  At nursing home, she was difficult to arouse.  Labs significant for hyponatremia, acute kidney injury.  UA negative for infection on admission.  CT negative for intracranial abnormality on admission.  Chest x-ray concerning for left lower lobe pneumonia.  She was started on isotonic fluids subsequently had hyper natremia.  Also started on broad-spectrum antibiotics.  Nephrology is consulted for acute kidney injury and hypernatremia.  No family present at bedside.  History is obtained from chart review.        Past Medical History:    No past medical history on file.    Past Surgical History:    No past surgical history on file.    Current Medications:    No current facility-administered medications on file prior to encounter.     Current Outpatient Medications on File Prior to Encounter   Medication Sig Dispense Refill    apixaban (ELIQUIS) 2.5 MG TABS tablet Take 1 tablet by mouth 2 times daily Every 12 hours      divalproex (DEPAKOTE ER) 250 MG extended release tablet Take 1 tablet by mouth every 12 hours      Multiple Vitamins-Minerals (THERAPEUTIC MULTIVITAMIN-MINERALS) tablet Take 1 tablet by mouth daily      QUEtiapine (SEROQUEL) 25 MG tablet Take 1 tablet by mouth 2 times daily      silver sulfADIAZINE (SILVADENE) 1 % cream Apply 1 each topically daily Apply topically daily.      rivaroxaban (XARELTO) 15 MG TABS tablet Take 1 tablet by mouth Daily with supper (Patient not taking: Reported on 3/9/2024) 42 tablet 0    valsartan-hydroCHLOROthiazide (DIOVAN-HCT) 160-12.5 MG per tablet Take 1 tablet by mouth daily 30 tablet 0    pramipexole  in size  EXTREMITIES:  No cyanosis or clubbing.  SKIN: Warm and dry. No significant rashes  Rojas in place    DATA:    RFP:   Recent Labs     03/09/24  0405 03/10/24  0500   * 152*   K 3.0* 3.8   * 121*   CO2 18* 18*   BUN 69* 56*   CREATININE 1.8* 1.6*   CALCIUM 7.5* 8.0*   MG 2.20  --        Liver panel:  Recent Labs     03/09/24  0405   AST 21   ALT 14       Endocrine:   @[id number](VitD,PTH,TSH,aldosterone,renin activity,cortisol,metanephrine)@    CBC:   Recent Labs     03/09/24  0405 03/10/24  0500   WBC 10.6 5.3   HGB 9.0* 8.9*   HCT 27.2* 27.1*   MCV 91.0 91.5    137       Iron Panel:   @[id number](FERA,FE)@    Serology: @[id number](ANAS,ANCA,C3,C4,RNP,AGBM,DNA,SSA,SSB,SPEP,UPEP,ESR,HEPT)@    Urine studies: @[id number](UAPR,UCOL,UNAR,UUNR,UCRR,UCLR,UKR,UUAR,UOSM,EOS)@      IMPRESSION/RECOMMENDATIONS:      Acute kidney injury-likely prerenal and profound hypovolemia.  Also notable to have C. difficile.  Received isotonic fluids on admission which is now changed to D5W.  Agree with this change as patient has now hypernatremic.    Hypernatremia-likely intravascular volume depletion and poor free water. intake.  D5 started this morning.  Continue to monitor.    Metabolic acidosis-likely in setting of acute kidney injury.  Monitor for now.  When sodium is better, can start on hypotonic bicarb fluids.    Acute encephalopathy-dementia versus metabolic versus toxic from infection.  On antibiotics.  Hypernatremia recommendations as above    Hypertension-hold valsartan and diuretics.  Continue metoprolol.    Palliative care consultation recommended.    Thank you for allowing me to participate in the care of this patient. Please contact via [email or web address] if any questions or concerns.       Devyn Casiano,   3/10/2024

## 2024-03-10 NOTE — CARE COORDINATION
Discharge Planning:     (PIPPA) reviewed chart which reports that the patient is from the following nursing facility:    [facility] (Formerly known as [facility])  [address] [place] [address]  Phone: [phone]  Fax: [phone]    CM called the [facility] admissions staff, Heather ([phone]), and left a voicemail requesting a callback to confirm patient's residency and ability to return upon discharge.          DAVI Bear, [facility]  Social Work -   [phone]    Electronically signed by SRINIVASAN Asif on 3/10/2024 at 9:38 AM        Update at 1047:  PIPPA received a callback from [facility] admissions staff, Heather, who confirmed that patient is a skilled nursing facility (SNF) resident and can return upon discharge with a pre-cert. Heather reported that the plan is for patient to eventually transition over to Long-Term Care. Heather reported that she will have to start the pre-cert for patient when needed, as it is a [place] SNF. Heather requested that patient's needed pre-cert documentation be faxed to her at [phone] closer to discharge as she does not have access to the [employer] system.      DAVI Bear, [facility]  Social Work -   [phone]    Electronically signed by SRINIVASAN Asif on 3/10/2024 at 10:53 AM

## 2024-03-10 NOTE — CARE COORDINATION
Case Management Assessment  Initial Evaluation    Date/Time of Evaluation: 3/10/2024 11:02 AM  Assessment Completed by: SRINIVASAN Asif    If patient is discharged prior to next notation, then this note serves as note for discharge by case management.    Patient Name: Jes Garcia                   YOB: 1932  Diagnosis: Sepsis (HCC) [A41.9]  Pneumonia due to infectious organism [J18.9]  Pneumonia due to infectious organism, unspecified laterality, unspecified part of lung [J18.9]                   Date / Time: 3/9/2024  1:18 AM    Patient Admission Status: Inpatient   Readmission Risk (Low < 19, Mod (19-27), High > 27): Readmission Risk Score: 18.3    Current PCP: No primary care provider on file.  PCP verified by ? Yes    Chart Reviewed: Yes      History Provided by: Unable to Assess (Completed with patient's Nephneel Felipe Biswas (on emergency contact list), via telephone.)  Patient Orientation: Other (see comment) (Completed with patient's JaradneelFelipe (on emergency contact list), via telephone.)    Patient Cognition: Other (see comment) (Completed with patient's Emanuel Mejiacinda Biswas (on emergency contact list), via telephone.)    Hospitalization in the last 30 days (Readmission):  No    If yes, Readmission Assessment in  Navigator will be completed.    Advance Directives:      Code Status: Full Code   Patient's Primary Decision Maker is: Legal Next of Kin    Primary Decision Maker: Felipe Biswas - Niece/Nephew - [phone]    Discharge Planning:    Patient lives with: Family Members (Lived with Sister and Nephew. Nephew cared for patient.) Type of Home: House  Primary Care Giver: Other (Comment) (Skilled Nursing Facility (SNF) staff were providing patient's care.)  Patient Support Systems include: Family Members (Nephew, Felipe)   Current Financial resources: Medicare  Current community resources: ECF/Home Care  Current services prior to admission: Durable Medical Equipment             is in. Nephew reported that, prior to going to that facility patient lived at home with him and patient's Sister and that he provided care for both. Nephew reported that the process of obtaining Medicaid has been initiated for patient. Patient agreed to provide CM Team with 3 other [place] skilled nursing facilities (SNF) tomorrow for referrals to be made.    The Plan for Transition of Care is related to the following treatment goals of Sepsis (HCC) [A41.9]  Pneumonia due to infectious organism [J18.9]  Pneumonia due to infectious organism, unspecified laterality, unspecified part of lung [J18.9]    IF APPLICABLE: The Patient and/or patient representative Jes and her family were provided with a choice of provider and agrees with the discharge plan. Freedom of choice list with basic dialogue that supports the patient's individualized plan of care/goals and shares the quality data associated with the providers was provided to:     Patient Representative Name:       The Patient and/or Patient Representative Agree with the Discharge Plan?      SRINIVASAN Asif  Case Management Department  Ph: [phone] Fax: [phone]

## 2024-03-10 NOTE — CARE COORDINATION
Discharge Planning:     (CM) informed by Physician of needed Hospice consult for inpatient Hospice placement. CM called and spoke with patient's Nephew, Felipe Biswas (on emergency contact list), who agreed to a Hospice referral to the following Hospice agency:    [address] [place] [address]  Phone: [phone]    CM called and spoke with staff, Mary, who agreed to have the on-call Hospice Nurse call CM back.      Sofia MATTSON, SULEMAN-S, [facility][note 3]  Social Work -   [phone]    Electronically signed by SRINIVASAN Asif on 3/10/2024 at 2:25 PM

## 2024-03-10 NOTE — PROGRESS NOTES
Hospitalist Progress Note      PCP: No primary care provider on file.    Date of Admission: 3/9/2024    Chief Complaint: Altered mental status    Hospital Course:   Patient confused agitated  History reviewed from the chart and history and physical      Medications:  Reviewed      Exam:    /60   Pulse 70   Temp 97.2 °F (36.2 °C) (Axillary)   Resp 18   Ht 1.6 m (5' 3\")   Wt 44.2 kg (97 lb 7.1 oz)   SpO2 96%   BMI 17.26 kg/m²     General appearance: Confused agitated  HEENT: Pupils equal, round, and reactive to light. Conjunctivae/corneas clear.  Dry mucous membrane  Neck: Supple, with full range of motion. No jugular venous distention. Trachea midline.  Respiratory:  Normal respiratory effort. Clear to auscultation, bilaterally without RALES/WHEEZES/Rhonchi.  Cardiovascular: Regular rate and rhythm with normal S1/S2 without MURMURS, rubs or gallops.  Abdomen: Mild generalized tenderness bowel sounds positive  Musculoskeletal: No clubbing, cyanosis or EDEMA bilaterally.  Full range of motion without deformity.  Skin: Skin color, texture, turgor normal.  No rashes or lesions.  Neurologic: Confused agitated moving all 4 EXTR extremities      Labs:   Recent Labs     03/09/24  0405 03/10/24  0500   WBC 10.6 5.3   HGB 9.0* 8.9*   HCT 27.2* 27.1*    137     Recent Labs     03/09/24  0405 03/10/24  0500   * 152*   K 3.0* 3.8   * 121*   CO2 18* 18*   BUN 69* 56*   CREATININE 1.8* 1.6*   CALCIUM 7.5* 8.0*     Recent Labs     03/09/24  0405   AST 21   ALT 14   BILITOT 0.5   ALKPHOS 88     No results for input(s): \"INR\" in the last 72 hours.  No results for input(s): \"CKTOTAL\", \"TROPONINI\" in the last 72 hours.    Urinalysis:    No results found for: \"NITRU\", \"WBCUA\", \"BACTERIA\", \"RBCUA\", \"BLOODU\", \"SPECGRAV\", \"GLUCOSEU\"    Radiology:  XR CHEST PORTABLE   Preliminary Result   Pulmonary vascular congestion along with mild degree of patchy/hazy airspace   opacities in predominantly

## 2024-03-10 NOTE — CARE COORDINATION
Discharge Planning:     (CM) received a call from [facility] Nurse, Concepción ([phone]), and provided Hospice referral for patient. Concepción confirmed that the have an inpatient Hospice unit in [place], [place]. Concepción reported that she will reach out to patient's Nephew today and that a Hospice Nurse will come to the hospital tomorrow to assess the patient. Concepción to keep CM Team updated on discharge plans.      Sofia MATTSON, DAVI, [facility]  Social Work -   [phone]    Electronically signed by SRINIVASAN Asif on 3/10/2024 at 3:18 PM

## 2024-03-11 PROCEDURE — 6360000002 HC RX W HCPCS: Performed by: INTERNAL MEDICINE

## 2024-03-11 PROCEDURE — 1200000000 HC SEMI PRIVATE

## 2024-03-11 RX ORDER — MORPHINE SULFATE 100 MG/5ML
10 SOLUTION ORAL
Qty: 1 ML | Refills: 0
Start: 2024-03-11 | End: 2024-03-14

## 2024-03-11 RX ORDER — SCOLOPAMINE TRANSDERMAL SYSTEM 1 MG/1
1 PATCH, EXTENDED RELEASE TRANSDERMAL
Qty: 1 PATCH | Refills: 0
Start: 2024-03-11

## 2024-03-11 RX ORDER — VANCOMYCIN HYDROCHLORIDE 250 MG/1
250 CAPSULE ORAL 4 TIMES DAILY
Qty: 40 CAPSULE | Refills: 0
Start: 2024-03-11 | End: 2024-03-21

## 2024-03-11 RX ORDER — LORAZEPAM 2 MG/ML
1 CONCENTRATE ORAL
Qty: 1 ML | Refills: 0
Start: 2024-03-11 | End: 2024-03-25

## 2024-03-11 RX ORDER — METRONIDAZOLE 500 MG/100ML
500 INJECTION, SOLUTION INTRAVENOUS EVERY 8 HOURS
Status: DISCONTINUED | OUTPATIENT
Start: 2024-03-11 | End: 2024-03-12 | Stop reason: HOSPADM

## 2024-03-11 RX ADMIN — MORPHINE SULFATE 2 MG: 2 INJECTION, SOLUTION INTRAMUSCULAR; INTRAVENOUS at 08:05

## 2024-03-11 RX ADMIN — DIAZEPAM 5 MG: 5 INJECTION, SOLUTION INTRAMUSCULAR; INTRAVENOUS at 05:47

## 2024-03-11 RX ADMIN — DIAZEPAM 5 MG: 5 INJECTION, SOLUTION INTRAMUSCULAR; INTRAVENOUS at 12:57

## 2024-03-11 RX ADMIN — MORPHINE SULFATE 2 MG: 2 INJECTION, SOLUTION INTRAMUSCULAR; INTRAVENOUS at 01:26

## 2024-03-11 RX ADMIN — MORPHINE SULFATE 4 MG: 4 INJECTION, SOLUTION INTRAMUSCULAR; INTRAVENOUS at 22:34

## 2024-03-11 RX ADMIN — METRONIDAZOLE 500 MG: 500 INJECTION, SOLUTION INTRAVENOUS at 13:01

## 2024-03-11 ASSESSMENT — PAIN SCALES - PAIN ASSESSMENT IN ADVANCED DEMENTIA (PAINAD)
NEGVOCALIZATION: 0
BODYLANGUAGE: RELAXED
NEGVOCALIZATION: 0
FACIALEXPRESSION: SMILING OR INEXPRESSIVE
CONSOLABILITY: 0
FACIALEXPRESSION: SMILING OR INEXPRESSIVE
BODYLANGUAGE: 0
NEGVOCALIZATION: 0
FACIALEXPRESSION: SMILING OR INEXPRESSIVE
BODYLANGUAGE: RELAXED
CONSOLABILITY: NO NEED TO CONSOLE
CONSOLABILITY: 0
FACIALEXPRESSION: 1
BREATHING: 0
BODYLANGUAGE: 0
FACIALEXPRESSION: SMILING OR INEXPRESSIVE
NEGVOCALIZATION: 0
TOTALSCORE: 2
TOTALSCORE: 0
CONSOLABILITY: 0
BODYLANGUAGE: 0
TOTALSCORE: 0
CONSOLABILITY: 0
CONSOLABILITY: NO NEED TO CONSOLE
BODYLANGUAGE: RELAXED
BREATHING: NORMAL
CONSOLABILITY: 0
BODYLANGUAGE: TENSE, DISTRESSED PACING, FIDGETING
CONSOLABILITY: NO NEED TO CONSOLE
NEGVOCALIZATION: 0
BODYLANGUAGE: 0
BREATHING: NORMAL
FACIALEXPRESSION: SMILING OR INEXPRESSIVE
FACIALEXPRESSION: SMILING OR INEXPRESSIVE
CONSOLABILITY: 0
BODYLANGUAGE: RELAXED
FACIALEXPRESSION: SMILING OR INEXPRESSIVE
BREATHING: NORMAL
BREATHING: 0
FACIALEXPRESSION: 0
BREATHING: 0
FACIALEXPRESSION: 0
TOTALSCORE: 0
CONSOLABILITY: NO NEED TO CONSOLE
FACIALEXPRESSION: 0
FACIALEXPRESSION: SAD, FRIGHTENED, FROWN
CONSOLABILITY: 0
BREATHING: NORMAL
FACIALEXPRESSION: 0
BREATHING: NORMAL
BREATHING: NORMAL
FACIALEXPRESSION: SMILING OR INEXPRESSIVE
TOTALSCORE: 0
BREATHING: NORMAL
FACIALEXPRESSION: 0
FACIALEXPRESSION: 0
CONSOLABILITY: 0
FACIALEXPRESSION: 0
CONSOLABILITY: 0
FACIALEXPRESSION: 0
FACIALEXPRESSION: 0
CONSOLABILITY: NO NEED TO CONSOLE
CONSOLABILITY: 1
BODYLANGUAGE: 0
FACIALEXPRESSION: 0
FACIALEXPRESSION: 0
NEGVOCALIZATION: 0
FACIALEXPRESSION: SMILING OR INEXPRESSIVE
BREATHING: NORMAL
CONSOLABILITY: NO NEED TO CONSOLE
FACIALEXPRESSION: SMILING OR INEXPRESSIVE
BREATHING: 0
FACIALEXPRESSION: SMILING OR INEXPRESSIVE
BODYLANGUAGE: 0
NEGVOCALIZATION: OCCASIONAL MOAN/GROAN, LOW SPEECH, NEGATIVE/DISAPPROVING QUALITY
CONSOLABILITY: 0
BODYLANGUAGE: 0
BREATHING: 0
BODYLANGUAGE: RELAXED
BODYLANGUAGE: 0
CONSOLABILITY: 0
TOTALSCORE: 0
BODYLANGUAGE: RELAXED
TOTALSCORE: 0
BODYLANGUAGE: RELAXED
BREATHING: NORMAL
BODYLANGUAGE: RELAXED
TOTALSCORE: 0
CONSOLABILITY: NO NEED TO CONSOLE
BREATHING: 0
NEGVOCALIZATION: 0
NEGVOCALIZATION: 0
CONSOLABILITY: NO NEED TO CONSOLE
CONSOLABILITY: 0
BREATHING: 0
TOTALSCORE: 0
CONSOLABILITY: NO NEED TO CONSOLE
BREATHING: NORMAL
BODYLANGUAGE: RELAXED
TOTALSCORE: 3
BODYLANGUAGE: 0
CONSOLABILITY: NO NEED TO CONSOLE
BREATHING: 0
BODYLANGUAGE: 0
FACIALEXPRESSION: SMILING OR INEXPRESSIVE
BREATHING: NORMAL
BREATHING: 0
BREATHING: 0
TOTALSCORE: 0
CONSOLABILITY: NO NEED TO CONSOLE
BREATHING: 1
BODYLANGUAGE: 0
BODYLANGUAGE: RELAXED
NEGVOCALIZATION: 0
TOTALSCORE: 0
BREATHING: OCCASIONAL LABORED BREATHING, SHORT PERIOD OF HYPERVENTILATION
BODYLANGUAGE: 1
NEGVOCALIZATION: 0
CONSOLABILITY: NO NEED TO CONSOLE
BODYLANGUAGE: RELAXED
BODYLANGUAGE: 0
NEGVOCALIZATION: 0
CONSOLABILITY: DISTRACTED OR REASSURED BY VOICE/TOUCH
BREATHING: NORMAL
TOTALSCORE: 0
BODYLANGUAGE: RELAXED
FACIALEXPRESSION: 0
NEGVOCALIZATION: 0
NEGVOCALIZATION: 1
BREATHING: 0

## 2024-03-11 NOTE — CARE COORDINATION
Discharge Planning:     (CM) spoke with [facility] staff, Heather ([phone]), who reported that family has signed Hospice consents for patient and that patient wi;; be going to the the following inpatient Hospice unit tomorrow between 10:00am and 11:00am via [employer] (no other ambulance company able to accommodate an earlier time):    [facility] Inpatient Unit  [address] [place] [address]  Phone: [phone]  Fax: [phone]    Completed SONI/AVS faxed t [facility] at above fax number. Transport packet and signed DNR-CC Form placed with [facility].     All CM discharge needs met at this time. Medical staff to inform CM team if additional discharge needs arise.           Sofia MATTSON, SULEMAN-S, [facility]  Social Work -   [phone]      Electronically signed by SRINIVASAN Asif on 3/11/2024 at 3:53 PM

## 2024-03-11 NOTE — PROGRESS NOTES
Hospitalist Progress Note      PCP: No primary care provider on file.    Date of Admission: 3/9/2024    Chief Complaint: Altered mental status    Hospital Course:   Patient is encephalopathic and not following commands.  Cannot obtain ROS as she is encephalopathic.      Medications:  Reviewed      Exam:    /61   Pulse 72   Temp 97.1 °F (36.2 °C) (Axillary)   Resp 14   Ht 1.6 m (5' 3\")   Wt 44.2 kg (97 lb 7.1 oz)   SpO2 (!) 89%   BMI 17.26 kg/m²     General appearance: Encephalopathic  HEENT: Pupils equal, round, and reactive to light. Conjunctivae/corneas clear.  Dry mucous membrane  Neck: Supple, with full range of motion. No jugular venous distention. Trachea midline.  Respiratory:  Coarse BL rhonchi  Cardiovascular: Regular rate and rhythm with normal S1/S2 without MURMURS, rubs or gallops.  Abdomen: Mild generalized tenderness bowel sounds positive  Musculoskeletal: No clubbing, cyanosis or EDEMA bilaterally.  Full range of motion without deformity.  Skin: Skin color, texture, turgor normal.  No rashes or lesions.  Neurologic: Encephalopathic, not following  commands      Labs:   Recent Labs     03/09/24  0405 03/10/24  0500   WBC 10.6 5.3   HGB 9.0* 8.9*   HCT 27.2* 27.1*    137       Recent Labs     03/09/24  0405 03/10/24  0500   * 152*   K 3.0* 3.8   * 121*   CO2 18* 18*   BUN 69* 56*   CREATININE 1.8* 1.6*   CALCIUM 7.5* 8.0*       Recent Labs     03/09/24  0405   AST 21   ALT 14   BILITOT 0.5   ALKPHOS 88       No results for input(s): \"INR\" in the last 72 hours.  No results for input(s): \"CKTOTAL\", \"TROPONINI\" in the last 72 hours.    Urinalysis:    No results found for: \"NITRU\", \"WBCUA\", \"BACTERIA\", \"RBCUA\", \"BLOODU\", \"SPECGRAV\", \"GLUCOSEU\"    Radiology:  XR CHEST PORTABLE   Final Result   Pulmonary vascular congestion along with mild degree of patchy/hazy airspace   opacities in predominantly perihilar/batwing distribution. Findings are   favored to represent  pulmonary edema, however multifocal pneumonia is not   excluded.             Assessment/Plan:    Active Hospital Problems    Diagnosis Date Noted    Sepsis (HCC) [A41.9] 03/09/2024    Pneumonia due to infectious organism [J18.9] 03/09/2024    Pneumonia due to infectious organism, unspecified laterality, unspecified part of lung [J18.9] 03/09/2024       Acute Medical Issues Being Addressed:  89-year-old admitted to the hospital with altered mental status    Acute encephalopathy metabolic secondary to C. difficile infection hyponatremia VANCE and dehydration  CT of the head was negative  IV  Valium and Morphine PRN comfort    C. difficile colitis  Mild generalized tenderness of the abdomen  Start IV Flagyl    Acute kidney injury with severe hyponatremia secondary to dehydration  Has a chronic Roajs catheter  Will not treat aggressively given comfort care    Paroxysmal atrial fibrillation  Off Eliquis and metoprolol as unable to take PO    Hypertension  For now hold all blood pressure medication    Dementia with overlying acute metabolic encephalopathy    I signed DNR-CC and completed med rec for IP Hospice.    Diet: ADULT DIET; Regular  Code Status: DNR-CC      Dispo - IP Hospice    Discussed with nursing and CM.    Awaiting transport to [facility] that is 2 hrs away.    William Meier MD

## 2024-03-11 NOTE — PROGRESS NOTES
Nephrology Progress Note  [facility]  [phone]   [id number]    Patient:  Jes Garcia   : 1932    CC:  vance     Subjective:  Patient awake. Not answering questions. No visitors. Per RN plans for family meeting this noon.     ROS:   Cannot obtain    SHx:  No visitors     Meds:  Scheduled Meds:  Continuous Infusions:   sodium chloride 20 mL/hr at 03/10/24 0518     PRN Meds:.morphine **OR** morphine, diazePAM, sodium chloride flush, sodium chloride, ondansetron **OR** ondansetron, polyethylene glycol, acetaminophen **OR** acetaminophen    Vitals:  /60   Pulse 89   Temp 97.2 °F (36.2 °C) (Axillary)   Resp 14   Ht 1.6 m (5' 3\")   Wt 44.2 kg (97 lb 7.1 oz)   SpO2 95%   BMI 17.26 kg/m²     Physical Exam:  Gen: Resting in bed, NAD. Wet cough    HEENT: MMM, OP clear.  CV: RRR, no S3.  Lungs: good respiratory effort and clear air entry   Abd: S/NT +BS  Ext: No edema, no cyanosis  Skin: Warm.  No rashes appreciated.    Labs:  CBC:   Lab Results   Component Value Date/Time    WBC 5.3 03/10/2024 05:00 AM    RBC 2.96 03/10/2024 05:00 AM    HGB 8.9 03/10/2024 05:00 AM    HCT 27.1 03/10/2024 05:00 AM    MCV 91.5 03/10/2024 05:00 AM    MCH 30.1 03/10/2024 05:00 AM    MCHC 32.9 03/10/2024 05:00 AM    RDW 18.5 03/10/2024 05:00 AM     03/10/2024 05:00 AM    MPV 8.7 03/10/2024 05:00 AM     BMP:    Lab Results   Component Value Date/Time     03/10/2024 05:00 AM    K 3.8 03/10/2024 05:00 AM     03/10/2024 05:00 AM    CO2 18 03/10/2024 05:00 AM    BUN 56 03/10/2024 05:00 AM    LABALBU 2.1 2024 04:05 AM    CREATININE 1.6 03/10/2024 05:00 AM    CALCIUM 8.0 03/10/2024 05:00 AM    LABGLOM 30 03/10/2024 05:00 AM    GLUCOSE 76 03/10/2024 05:00 AM       Assessment/Plan:  VANCE, prerenal from diminished perfusion in the setting of hypovolemia, C. difficile infection.  Creatinine on admission 1.8, 1.6 yesterday.  Baseline creatinine is 1.  Received IV fluids.  Now off  them.    Hypernatremia, hypovolemic from decreased free water intake.  Status post D5 water.  Now off IV fluids.    Metabolic acidosis from VANCE and diarrhea..     Acute encephalopathy-dementia versus metabolic versus toxic from infection.  On antibiotics.  Hypernatremia recommendations as above     Hypertension-held valsartan and diuretics.  Now off on aggressive treatment. .     Anemia likely from CKD and CHF    Palliative care consultation noted.  Discussed with nursing.  Noted plans for transition to hospice this afternoon.  No labs today.  Renal will sign off.    Jyoti Haynes MD  [facility]  Office Number: [phone]  [facility]

## 2024-03-11 NOTE — CARE COORDINATION
Discharge Planning:     (PIPPA) received a voicemail from [facility] Nurse, Concepción ([phone]), who reported that she will be meeting with the patient's family at the hospital at 1:00pm today. Nurse, Caron, updated on meeting time.      Sofia MATTSON, SULEMAN-S, [facility]  Social Work -   [phone]    Electronically signed by SRINIVASAN Asif on 3/11/2024 at 9:06 AM

## 2024-03-11 NOTE — DISCHARGE INSTR - COC
Continuity of Care Form    Patient Name: Jes Garcia   :  1932  MRN:  [id number]    Admit date:  3/9/2024  Discharge date:  3/11/23    Code Status Order: DNR-CC   Advance Directives:     Admitting Physician:  Laurent Drummond MD  PCP: No primary care provider on file.    Discharging Nurse: Caron  Discharging Hospital Unit/Room#: 5TN-5561/5561-01  Discharging Unit Phone Number: [phone]    Emergency Contact:   Extended Emergency Contact Information  Primary Emergency Contact: Felipe Biswas  Mobile Phone: [phone]  Relation: Niece/Nephew   needed? No  Secondary Emergency Contact: Frannie Mayer  Home Phone: [phone]  Relation: Brother/Sister   needed? No    Past Surgical History:  No past surgical history on file.    Immunization History:     There is no immunization history on file for this patient.    Active Problems:  Patient Active Problem List   Diagnosis Code    VANCE (acute kidney injury) ([facility]) N17.9    Generalized weakness R53.1    Dementia ([facility]) F03.90    Sepsis ([facility]) A41.9    Pneumonia due to infectious organism J18.9    Pneumonia due to infectious organism, unspecified laterality, unspecified part of lung J18.9       Isolation/Infection:   Isolation            C Diff Contact          Patient Infection Status       Infection Onset Added Last Indicated Last Indicated By Review Planned Expiration Resolved Resolved By    C-diff (Clostridium difficile) 03/09/24 03/10/24 03/09/24 C. difficile toxin Molecular 24                         Nurse Assessment:  Last Vital Signs: /62   Pulse 95   Temp 97.1 °F (36.2 °C) (Axillary)   Resp 14   Ht 1.6 m (5' 3\")   Wt 44.2 kg (97 lb 7.1 oz)   SpO2 93%   BMI 17.26 kg/m²     Last documented pain score (0-10 scale):    Last Weight:   Wt Readings from Last 1 Encounters:   24 44.2 kg (97 lb 7.1 oz)     Mental Status:  disoriented and agitated at times    IV Access:  - PICC - site  L Upper Arm, insertion date:  personal belongings (please select all that are sent with patient):  Dentures lower    RN SIGNATURE:  Electronically signed by Caron Benítez RN on 3/11/24 at 2:50 PM EDT    CASE MANAGEMENT/SOCIAL WORK SECTION    Inpatient Status Date: 3/9/2024    Readmission Risk Assessment Score:  Readmission Risk              Risk of Unplanned Readmission:  12           Discharging to Facility/ Agency     [facility]  [facility] [place] [address]  Phone: [phone]  Fax: [phone]    / signature: Electronically signed by SRINIVASAN Asif on 3/11/2024 at 2:43 PM      PHYSICIAN SECTION    Prognosis: Poor    Condition at Discharge: Terminal    Rehab Potential (if transferring to Rehab): Poor    Recommended Labs or Other Treatments After Discharge:     Physician Certification: I certify the above information and transfer of Jes Garcia  is necessary for the continuing treatment of the diagnosis listed and that she requires Hospice for less 30 days.     Update Admission H&P: No change in H&P    PHYSICIAN SIGNATURE:  Electronically signed by William Meier MD on 3/11/24 at 3:15 PM EDT

## 2024-03-12 VITALS
HEIGHT: 63 IN | WEIGHT: 97.44 LBS | TEMPERATURE: 97 F | BODY MASS INDEX: 17.27 KG/M2 | RESPIRATION RATE: 16 BRPM | SYSTOLIC BLOOD PRESSURE: 104 MMHG | OXYGEN SATURATION: 90 % | HEART RATE: 68 BPM | DIASTOLIC BLOOD PRESSURE: 62 MMHG

## 2024-03-12 PROCEDURE — 6360000002 HC RX W HCPCS: Performed by: INTERNAL MEDICINE

## 2024-03-12 RX ADMIN — DIAZEPAM 5 MG: 5 INJECTION, SOLUTION INTRAMUSCULAR; INTRAVENOUS at 01:30

## 2024-03-12 RX ADMIN — MORPHINE SULFATE 2 MG: 2 INJECTION, SOLUTION INTRAMUSCULAR; INTRAVENOUS at 01:30

## 2024-03-12 ASSESSMENT — PAIN SCALES - PAIN ASSESSMENT IN ADVANCED DEMENTIA (PAINAD)
NEGVOCALIZATION: 1
BREATHING: NORMAL
NEGVOCALIZATION: OCCASIONAL MOAN/GROAN, LOW SPEECH, NEGATIVE/DISAPPROVING QUALITY
BODYLANGUAGE: 0
BODYLANGUAGE: RELAXED
BREATHING: 0
CONSOLABILITY: 1
FACIALEXPRESSION: 1
FACIALEXPRESSION: SAD, FRIGHTENED, FROWN
TOTALSCORE: 3
CONSOLABILITY: 1
TOTALSCORE: 3
BREATHING: NORMAL
FACIALEXPRESSION: 1
BODYLANGUAGE: 0
CONSOLABILITY: DISTRACTED OR REASSURED BY VOICE/TOUCH
BODYLANGUAGE: RELAXED
BREATHING: 0
NEGVOCALIZATION: 1
CONSOLABILITY: DISTRACTED OR REASSURED BY VOICE/TOUCH
NEGVOCALIZATION: OCCASIONAL MOAN/GROAN, LOW SPEECH, NEGATIVE/DISAPPROVING QUALITY
FACIALEXPRESSION: SAD, FRIGHTENED, FROWN

## 2024-03-12 NOTE — DISCHARGE SUMMARY
Patient: Jes Garcia     Gender: female  : 1932   Age: 89 y.o.  MRN: [id number]    Admitting Physician: Laurent Drummond MD  Discharge Physician: Shira De La Fuente MD     Code Status: DNR-CC     Admit Date: 3/9/2024   Discharge Date: 3/12/2024      Disposition:  Inpatient Hospice      Discharge Diagnoses:  Acute encephalopathy metabolic  C. difficile colitis  Acute kidney injury with severe hyponatremia secondary to dehydration  Hypertension  Dementia    Active Hospital Problems    Diagnosis Date Noted    Sepsis (HCC) [A41.9] 2024    Pneumonia due to infectious organism [J18.9] 2024    Pneumonia due to infectious organism, unspecified laterality, unspecified part of lung [J18.9] 2024         Condition at Discharge:  Terminal    Hospital Course:      Signed                    Hospitalist Progress Note        PCP: No primary care provider on file.     Date of Admission: 3/9/2024     Chief Complaint: Altered mental status     Hospital Course:   Patient confused agitated  History reviewed from the chart and history and physical        Medications:  Reviewed        Exam:     /60   Pulse 70   Temp 97.2 °F (36.2 °C) (Axillary)   Resp 18   Ht 1.6 m (5' 3\")   Wt 44.2 kg (97 lb 7.1 oz)   SpO2 96%   BMI 17.26 kg/m²      General appearance: Confused agitated  HEENT: Pupils equal, round, and reactive to light. Conjunctivae/corneas clear.  Dry mucous membrane  Neck: Supple, with full range of motion. No jugular venous distention. Trachea midline.  Respiratory:  Normal respiratory effort. Clear to auscultation, bilaterally without RALES/WHEEZES/Rhonchi.  Cardiovascular: Regular rate and rhythm with normal S1/S2 without MURMURS, rubs or gallops.  Abdomen: Mild generalized tenderness bowel sounds positive  Musculoskeletal: No clubbing, cyanosis or EDEMA bilaterally.  Full range of motion without deformity.  Skin: Skin color, texture, turgor normal.  No rashes or lesions.  Neurologic: Confused  Daily Amount: 24 mg, Disp-1 mL, R-0NO PRINT           Discharge Medication List as of 3/11/2024  3:34 PM        Discharge Medication List as of 3/11/2024  3:34 PM        Discharge Medication List as of 3/11/2024  3:34 PM        STOP taking these medications       apixaban (ELIQUIS) 2.5 MG TABS tablet Comments:   Reason for Stopping:         divalproex (DEPAKOTE ER) 250 MG extended release tablet Comments:   Reason for Stopping:         Multiple Vitamins-Minerals (THERAPEUTIC MULTIVITAMIN-MINERALS) tablet Comments:   Reason for Stopping:         QUEtiapine (SEROQUEL) 25 MG tablet Comments:   Reason for Stopping:         silver sulfADIAZINE (SILVADENE) 1 % cream Comments:   Reason for Stopping:         rivaroxaban (XARELTO) 15 MG TABS tablet Comments:   Reason for Stopping:         valsartan-hydroCHLOROthiazide (DIOVAN-HCT) 160-12.5 MG per tablet Comments:   Reason for Stopping:         pramipexole (MIRAPEX) 0.25 MG tablet Comments:   Reason for Stopping:         metoprolol succinate (TOPROL XL) 50 MG extended release tablet Comments:   Reason for Stopping:         levothyroxine (SYNTHROID) 88 MCG tablet Comments:   Reason for Stopping:               Labs: For convenience and continuity at follow-up the following most recent labs are provided:    Lab Results   Component Value Date/Time    WBC 5.3 03/10/2024 05:00 AM    HGB 8.9 03/10/2024 05:00 AM    HCT 27.1 03/10/2024 05:00 AM    MCV 91.5 03/10/2024 05:00 AM     03/10/2024 05:00 AM     03/10/2024 05:00 AM    K 3.8 03/10/2024 05:00 AM     03/10/2024 05:00 AM    CO2 18 03/10/2024 05:00 AM    BUN 56 03/10/2024 05:00 AM    CREATININE 1.6 03/10/2024 05:00 AM    CALCIUM 8.0 03/10/2024 05:00 AM    ALKPHOS 88 03/09/2024 04:05 AM    ALT 14 03/09/2024 04:05 AM    AST 21 03/09/2024 04:05 AM    BILITOT 0.5 03/09/2024 04:05 AM    LABALBU 2.1 03/09/2024 04:05 AM     No results found for: \"INR\"        Signed:    Shira De La Fuente MD   3/12/2024      Thank you No  primary care provider on file. for the opportunity to be involved in this patient's care. If you have any questions or concerns please feel free to contact me

## 2024-03-12 NOTE — PROGRESS NOTES
Shift assessment completed. Routine vitals obtained. Congestion and infrequent coughing noted. Patient's bed in lowest position, bed alarm on and call light within reach.

## 2024-03-12 NOTE — ACP (ADVANCE CARE PLANNING)
Advanced Care Planning Note.    Purpose of Encounter: Advanced care planning in light of dementia  Parties In Attendance: Patient, nephew Felipe Biswas on  phone  Decisional Capacity: No  Subjective: Patient is encephalopathic, cannot provide ROS  Objective: Cr 1.6 on 3/10/24  Goals of Care Determination: Patient/POA wishes to focus on comfort and expressed the desired to pursue hospice.  Plan:  Hospice consult. Discuss Hospice care with case management. Review medications to assure focus on symptom relief and comfort.  Code Status: DNR CC   Time spent on Advanced care Plannin minutes  Advanced Care Planning Documents: Completed advanced directives on chart, nephew is the POA.    William Meier MD  3/12/2024 5:39 PM

## 2024-03-12 NOTE — PLAN OF CARE
Problem: Pain  Goal: Verbalizes/displays adequate comfort level or baseline comfort level  Outcome: Progressing  Flowsheets (Taken 3/11/2024 2231 by Chico Spann)  Verbalizes/displays adequate comfort level or baseline comfort level:   Assess pain using appropriate pain scale   Administer analgesics based on type and severity of pain and evaluate response   Consider cultural and social influences on pain and pain management   Implement non-pharmacological measures as appropriate and evaluate response   Notify Licensed Independent Practitioner if interventions unsuccessful or patient reports new pain     Problem: Safety - Adult  Goal: Free from fall injury  Outcome: Progressing     Problem: ABCDS Injury Assessment  Goal: Absence of physical injury  Outcome: Progressing     Problem: Skin/Tissue Integrity  Goal: Absence of new skin breakdown  Description: 1.  Monitor for areas of redness and/or skin breakdown  2.  Assess vascular access sites hourly  3.  Every 4-6 hours minimum:  Change oxygen saturation probe site  4.  Every 4-6 hours:  If on nasal continuous positive airway pressure, respiratory therapy assess nares and determine need for appliance change or resting period.  Outcome: Progressing

## 2024-03-12 NOTE — DISCHARGE SUMMARY
Hospital Medicine Discharge Summary    Patient: Jes Garcia     Gender: female  : 1932   Age: 89 y.o.  MRN: [id number]    Admitting Physician: Laurent Drummond MD  Discharge Physician: William Meier MD     Code Status: DNR-CC    Admit Date: 3/9/2024   Discharge Date: 3/12/2024      Disposition:  [facility] [place]    Discharge Diagnoses:    Active Hospital Problems    Diagnosis Date Noted    Sepsis (HCC) [A41.9] 2024    Pneumonia due to infectious organism [J18.9] 2024    Pneumonia due to infectious organism, unspecified laterality, unspecified part of lung [J18.9] 2024       Follow-up appointments:  one week    Outpatient to do list: F/U with PCP    Condition at Discharge:  Terminal    Hospital Course:   89-year-old admitted to the hospital with altered mental status     Acute encephalopathy metabolic secondary to C. difficile infection hyponatremia VANCE and dehydration  CT of the head was negative  IV  Valium and Morphine PRN comfort     C. difficile colitis  Mild generalized tenderness of the abdomen  Start IV Flagyl     Acute kidney injury with severe hyponatremia secondary to dehydration  Has a chronic Rojas catheter  Will not treat aggressively given comfort care     Paroxysmal atrial fibrillation  Off Eliquis and metoprolol as unable to take PO     Hypertension  For now hold all blood pressure medication     Dementia with overlying acute metabolic encephalopathy     I signed DNR-CC and completed med rec for  Hospice.    Discussed with nephneel Biswas.  Will discharge to [facility] in [facility].        Discharge Medications:   Discharge Medication List as of 3/11/2024  3:34 PM        START taking these medications    Details   vancomycin (VANCOCIN) 250 MG capsule Take 1 capsule by mouth 4 times daily for 10 days, Disp-40 capsule, R-0NO PRINT      scopolamine (TRANSDERM-SCOP) transdermal patch Place 1 patch onto the skin every 72 hours, Disp-1 patch, R-0NO  pulmonary edema, however multifocal pneumonia is not excluded.       The patient was seen and examined on day of discharge and this discharge summary is in conjunction with any daily progress note from day of discharge.Time Spent on discharge is 35 minutes in the examination, evaluation, counseling and review of medications and discharge plan.      Note that more than 30 minutes was spent in preparing discharge papers, discussing discharge with patient, medication review, etc.       Signed:    William Meier MD   3/12/2024      Thank you No primary care provider on file. for the opportunity to be involved in this patient's care. If you have any questions or concerns please feel free to contact me at [facility]

## 2024-03-12 NOTE — PLAN OF CARE
Problem: Pain  Goal: Verbalizes/displays adequate comfort level or baseline comfort level  3/12/2024 0956 by Heidi Turner RN  Outcome: Completed  3/12/2024 0911 by Heidi Turner RN  Outcome: Progressing  Flowsheets (Taken 3/11/2024 2231 by Chico Spann)  Verbalizes/displays adequate comfort level or baseline comfort level:   Assess pain using appropriate pain scale   Administer analgesics based on type and severity of pain and evaluate response   Consider cultural and social influences on pain and pain management   Implement non-pharmacological measures as appropriate and evaluate response   Notify Licensed Independent Practitioner if interventions unsuccessful or patient reports new pain     Problem: Safety - Adult  Goal: Free from fall injury  3/12/2024 0956 by Heidi Turner RN  Outcome: Completed  3/12/2024 0911 by Heidi Turner RN  Outcome: Progressing     Problem: ABCDS Injury Assessment  Goal: Absence of physical injury  3/12/2024 0956 by Heidi Turner RN  Outcome: Completed  3/12/2024 0911 by Heidi Turner RN  Outcome: Progressing     Problem: Skin/Tissue Integrity  Goal: Absence of new skin breakdown  Description: 1.  Monitor for areas of redness and/or skin breakdown  2.  Assess vascular access sites hourly  3.  Every 4-6 hours minimum:  Change oxygen saturation probe site  4.  Every 4-6 hours:  If on nasal continuous positive airway pressure, respiratory therapy assess nares and determine need for appliance change or resting period.  3/12/2024 0956 by Heidi Turner RN  Outcome: Completed  3/12/2024 0911 by Heidi Turner RN  Outcome: Progressing